# Patient Record
Sex: FEMALE | Race: BLACK OR AFRICAN AMERICAN | NOT HISPANIC OR LATINO | Employment: OTHER | URBAN - METROPOLITAN AREA
[De-identification: names, ages, dates, MRNs, and addresses within clinical notes are randomized per-mention and may not be internally consistent; named-entity substitution may affect disease eponyms.]

---

## 2022-08-01 ENCOUNTER — TELEPHONE (OUTPATIENT)
Dept: FAMILY MEDICINE CLINIC | Facility: CLINIC | Age: 72
End: 2022-08-01

## 2022-08-01 NOTE — TELEPHONE ENCOUNTER
Patient is attempting to schedule her follow up mammogram but facility is stating they do not have an order, please send one.

## 2022-08-02 NOTE — TELEPHONE ENCOUNTER
CALLED AND SPOKE WITH PATIENT, LETTING HER KNOW THE ORDER HAS BEEN SENT TO [facility] FOR DIAGNOSTIC [facility]

## 2022-10-24 ENCOUNTER — CLINICAL SUPPORT (OUTPATIENT)
Dept: FAMILY MEDICINE CLINIC | Facility: CLINIC | Age: 72
End: 2022-10-24

## 2022-10-24 DIAGNOSIS — Z23 NEED FOR INFLUENZA VACCINATION: Primary | ICD-10-CM

## 2022-10-24 PROCEDURE — 90662 IIV NO PRSV INCREASED AG IM: CPT | Performed by: INTERNAL MEDICINE

## 2022-10-24 PROCEDURE — 90471 IMMUNIZATION ADMIN: CPT | Performed by: INTERNAL MEDICINE

## 2023-01-10 RX ORDER — PRAVASTATIN SODIUM 40 MG
40 TABLET ORAL DAILY
Qty: 90 TABLET | Refills: 0 | Status: SHIPPED | OUTPATIENT
Start: 2023-01-10

## 2023-01-10 RX ORDER — PRAVASTATIN SODIUM 40 MG
TABLET ORAL
Qty: 45 TABLET | OUTPATIENT
Start: 2023-01-10

## 2023-02-03 ENCOUNTER — OFFICE VISIT (OUTPATIENT)
Dept: FAMILY MEDICINE CLINIC | Facility: CLINIC | Age: 73
End: 2023-02-03
Payer: MEDICARE

## 2023-02-03 VITALS
OXYGEN SATURATION: 97 % | BODY MASS INDEX: 36.43 KG/M2 | RESPIRATION RATE: 16 BRPM | HEIGHT: 63 IN | WEIGHT: 205.6 LBS | TEMPERATURE: 97.1 F | SYSTOLIC BLOOD PRESSURE: 138 MMHG | DIASTOLIC BLOOD PRESSURE: 90 MMHG | HEART RATE: 69 BPM

## 2023-02-03 DIAGNOSIS — Z00.00 ANNUAL PHYSICAL EXAM: Primary | ICD-10-CM

## 2023-02-03 DIAGNOSIS — E78.2 MIXED HYPERLIPIDEMIA: ICD-10-CM

## 2023-02-03 DIAGNOSIS — H91.92 HEARING LOSS OF LEFT EAR, UNSPECIFIED HEARING LOSS TYPE: ICD-10-CM

## 2023-02-03 DIAGNOSIS — G47.33 OSA (OBSTRUCTIVE SLEEP APNEA): ICD-10-CM

## 2023-02-03 DIAGNOSIS — I25.10 CORONARY ARTERY DISEASE INVOLVING NATIVE CORONARY ARTERY OF NATIVE HEART WITHOUT ANGINA PECTORIS: ICD-10-CM

## 2023-02-03 DIAGNOSIS — Z12.31 ENCOUNTER FOR SCREENING MAMMOGRAM FOR MALIGNANT NEOPLASM OF BREAST: ICD-10-CM

## 2023-02-03 DIAGNOSIS — Z12.11 COLON CANCER SCREENING: ICD-10-CM

## 2023-02-03 DIAGNOSIS — E11.9 TYPE 2 DIABETES MELLITUS WITHOUT COMPLICATION, WITHOUT LONG-TERM CURRENT USE OF INSULIN: ICD-10-CM

## 2023-02-03 PROBLEM — F32.A ANXIETY AND DEPRESSION: Status: ACTIVE | Noted: 2023-02-03

## 2023-02-03 PROBLEM — F32.A ANXIETY AND DEPRESSION: Status: RESOLVED | Noted: 2023-02-03 | Resolved: 2023-02-03

## 2023-02-03 PROBLEM — F41.9 ANXIETY AND DEPRESSION: Status: RESOLVED | Noted: 2023-02-03 | Resolved: 2023-02-03

## 2023-02-03 PROBLEM — F41.9 ANXIETY AND DEPRESSION: Status: ACTIVE | Noted: 2023-02-03

## 2023-02-03 PROCEDURE — 93000 ELECTROCARDIOGRAM COMPLETE: CPT | Performed by: NURSE PRACTITIONER

## 2023-02-03 PROCEDURE — 99397 PER PM REEVAL EST PAT 65+ YR: CPT | Performed by: NURSE PRACTITIONER

## 2023-02-03 RX ORDER — MULTIPLE VITAMINS W/ MINERALS TAB 9MG-400MCG
1 TAB ORAL DAILY
COMMUNITY

## 2023-02-03 NOTE — ASSESSMENT & PLAN NOTE
Presents today for her first visit since April 2022.  History of normal Pap smear with last in 2015. Mammogram normal 6/9/2021, now due for repeat mammogram which we are ordering today..  DEXA scan normal 9/30/2016.  Colonoscopy per Dr. Martínez 10/19/2016 with 5 small sessile polyps, 3 year followup pursued 5/5/2021, patient canceled.  Is unamenable to obtaining colonoscopy currently, but is agreeable to Cologuard test. TDaP vaccine given 7/12/2015.

## 2023-02-03 NOTE — ASSESSMENT & PLAN NOTE
Last colonoscopy in 2016 with 5 small polyps, 3-year follow-up was recommended which would have been 2021 however she canceled.  She is not currently agreeable to scheduling colonoscopy but will pursue Cologuard test

## 2023-02-03 NOTE — ASSESSMENT & PLAN NOTE
Evaluations per Dr. Coyle, patient declines desire to pursue CPAP related to tolerability, understanding potential health benefits of use.  Advise if she changes her mind.

## 2023-02-03 NOTE — ASSESSMENT & PLAN NOTE
On screening EKG in 2015, she had a left bundle branch block, repeat EKG the same..  Patient asymptomatic from a cardiovascular perspective.She was referred to Dr. Coyle to assess, also resulting in a diagnostic catheterization on 8/20/2015, with mild coronary artery disease, no intervention necessary.  She continues to be asymptomatic. Some elevated of diastolic BP in office today which has never been an issues before. She does not check her BP at home.   Asked to begin home BP log for review. If staying greater than 140/80 most of the time will need consideration of hypertensive therapy

## 2023-02-03 NOTE — ASSESSMENT & PLAN NOTE
Diabetes mellitus type 2.  Prediabetic diagnosis 10/19/2020 with hemoglobin A1c 6.2%, diabetic diagnosis 5/5/2021 with hemoglobin A1c 5.5%. Ten months ago   hemoglobin A1c mildly improved to 6.3%, previous 6.5%.  No polyuria or polydipsia. She has not required medicine at this juncture with improved diet and exercise.   Rechecking A1C today

## 2023-02-03 NOTE — ASSESSMENT & PLAN NOTE
Taking pravastatin every other day due to some myalgias with daily use. Previous intolerance to simvastatin and lipitor due to muscle pains

## 2023-02-03 NOTE — PROGRESS NOTES
"    Annual Physical     Name: Alayna Dominguez    : 1950     MRN: [id number]     Chief Complaint  Annual Exam    Subjective     History of Present Illness:  Alayna Dominguez is a 72 y.o. female who presents today for annual physical.  She has chronic issues with type 2 diabetes, JENNIFER, hyperlipidemia and hearing loss of left ear.  History of EKG in  with left bundle branch block, extensive evaluation by Dr. Coyle no abnormal findings.  Continues to be asymptomatic.  Up-to-date with vision and dental screenings. She has no complaints or concerns today.     The patient is being seen for a health maintenance evaluation.    Social History  Tobacco Use: Low Risk    • Smoking Tobacco Use: Never   • Smokeless Tobacco Use: Never   • Passive Exposure: Not on file     Social History     Socioeconomic History   • Marital status: Single   Tobacco Use   • Smoking status: Never   • Smokeless tobacco: Never   Vaping Use   • Vaping Use: Never used   Substance and Sexual Activity   • Alcohol use: Yes     Comment: occasional   • Drug use: Never   • Sexual activity: Not Currently       General History  Alayna  does have regular dental visits.  She does not complain of vision problems.   Immunizations are not up to date. The patient needs the following immunizations: TDAP and zoster    Lifestyle  Alayna  consumes in general, a \"healthy\" diet  .  She exercises never.    Reproductive Health  Alayna  is postmenopausal.    Screening  Last pap was   Last Completed Pap Smear     This patient has no relevant Health Maintenance data.      . History of abnormal pap smear or family history of gyn cancer: no  Last mammogram was   Last Completed Mammogram     This patient has no relevant Health Maintenance data.      . Personal or family history of abnormal mammograms or breast cancer: yes, she has had abnormal mammogram  Last colonoscopy was   Last Completed Colonoscopy     This patient has no relevant Health Maintenance data.    "   . Family history of colon cancer: no  Last DEXA was 2016    Health Maintenance Summary          Ordered - MAMMOGRAM (Every 2 Years) Ordered on 2/3/2023    No completion, postpone, or frequency change history exists for this topic.          Overdue - DXA SCAN (Every 2 Years) Overdue - never done    No completion, postpone, or frequency change history exists for this topic.          Ordered - COLORECTAL CANCER SCREENING (COLONOSCOPY - Every 10 Years) Ordered on 2/3/2023    No completion, postpone, or frequency change history exists for this topic.          Overdue - TDAP/TD VACCINES (1 - Tdap) Overdue - never done    No completion, postpone, or frequency change history exists for this topic.          Overdue - ZOSTER VACCINE (1 of 2) Overdue - never done    No completion, postpone, or frequency change history exists for this topic.          Overdue - Pneumococcal Vaccine 65+ (1 - PCV) Overdue - never done    No completion, postpone, or frequency change history exists for this topic.          Overdue - COVID-19 Vaccine (5 - Booster for Pfizer series) Overdue since 7/26/2022 05/31/2022  Imm Admin: Covid-19 (Pfizer) Gray Cap    10/18/2021  Imm Admin: COVID-19 (PFIZER) PURPLE CAP    02/15/2021  Imm Admin: COVID-19 (PFIZER) PURPLE CAP    01/25/2021  Imm Admin: COVID-19 (PFIZER) PURPLE CAP          Overdue - HEPATITIS C SCREENING (Once) Overdue - never done    No completion, postpone, or frequency change history exists for this topic.          Overdue - ANNUAL WELLNESS VISIT (Yearly) Overdue - never done    No completion, postpone, or frequency change history exists for this topic.          Ordered - LIPID PANEL (Yearly) Ordered on 2/3/2023    No completion, postpone, or frequency change history exists for this topic.          INFLUENZA VACCINE  Completed    10/24/2022  Imm Admin: Fluzone High-Dose 65+yrs    10/05/2021  Outside Immunization: Influenza, High Dose    10/06/2020  Outside Immunization: Fluzone High-Dose  "Quad    10/19/2018  Outside Immunization: Influenza, High Dose    09/15/2017  Outside Immunization: Influenza, High Dose    Only the first 5 history entries have been loaded, but more history exists.              Immunization History   Administered Date(s) Administered   • COVID-19 (PFIZER) PURPLE CAP 01/25/2021, 02/15/2021, 10/18/2021   • Covid-19 (Pfizer) Gray Cap 05/31/2022   • Fluzone High-Dose 65+yrs 10/24/2022       Review of Systems   Constitutional: Negative for activity change, appetite change and fatigue.   HENT: Positive for hearing loss. Negative for dental problem.    Eyes: Negative for photophobia and visual disturbance.   Respiratory: Negative for cough, choking, chest tightness, shortness of breath and wheezing.    Cardiovascular: Negative for chest pain, palpitations and leg swelling.   Gastrointestinal: Negative for abdominal pain, constipation, diarrhea, nausea and vomiting.   Endocrine: Negative for polydipsia and polyuria.   Genitourinary: Negative for difficulty urinating, hematuria and pelvic pain.   Musculoskeletal: Negative for arthralgias and myalgias.   Skin: Negative for rash.   Neurological: Negative for dizziness, weakness, light-headedness and headaches.   Psychiatric/Behavioral: Negative for dysphoric mood and sleep disturbance. The patient is not nervous/anxious.        Objective     Vital Signs  /90 (BP Location: Left arm, Patient Position: Sitting, Cuff Size: Adult)   Pulse 69   Temp 97.1 °F (36.2 °C) (Temporal)   Resp 16   Ht 160 cm (63\")   Wt 93.3 kg (205 lb 9.6 oz)   SpO2 97%   BMI 36.42 kg/m²   Estimated body mass index is 36.42 kg/m² as calculated from the following:    Height as of this encounter: 160 cm (63\").    Weight as of this encounter: 93.3 kg (205 lb 9.6 oz).    Class 2 Severe Obesity (BMI >=35 and <=39.9). Obesity-related health conditions include the following: obstructive sleep apnea and diabetes mellitus. Obesity is unchanged. BMI is is above average; " BMI management plan is completed. We discussed portion control and increasing exercise.      Physical Exam  Vitals reviewed.   Constitutional:       Appearance: Normal appearance.   HENT:      Head: Normocephalic and atraumatic.      Right Ear: Tympanic membrane, ear canal and external ear normal.      Left Ear: Tympanic membrane, ear canal and external ear normal.      Nose: Nose normal.      Mouth/Throat:      Mouth: Mucous membranes are moist.      Pharynx: Oropharynx is clear.   Eyes:      Conjunctiva/sclera: Conjunctivae normal.   Cardiovascular:      Rate and Rhythm: Normal rate and regular rhythm.      Pulses: Normal pulses.      Heart sounds: Normal heart sounds.   Pulmonary:      Effort: Pulmonary effort is normal.      Breath sounds: Normal breath sounds.   Abdominal:      General: Bowel sounds are normal.      Palpations: Abdomen is soft.   Musculoskeletal:         General: Normal range of motion.      Cervical back: Normal range of motion and neck supple.   Skin:     General: Skin is warm and dry.   Neurological:      Mental Status: She is alert and oriented to person, place, and time.   Psychiatric:         Mood and Affect: Mood normal.         Behavior: Behavior normal.         Thought Content: Thought content normal.         Judgment: Judgment normal.       ECG 12 Lead    Date/Time: 2/3/2023 3:50 PM  Performed by: Ruth Ramos APRN  Authorized by: Ruth Ramos APRN   Comparison: compared with previous ECG from 10/19/2020  Similar to previous ECG  Rhythm: sinus rhythm  Rate: normal  Conduction: left bundle branch block  ST Segments: ST segments normal  T Waves: T waves normal  QRS axis: normal  Other: no other findings    Clinical impression: abnormal EKG  Comments: Left bundle branch block thoroughly investigated and cleared by cardiology             Assessment and Plan     Diagnoses and all orders for this visit:    1. Annual physical exam (Primary)  Assessment & Plan:  Presents today for  her first visit since April 2022.  History of normal Pap smear with last in 2015. Mammogram normal 6/9/2021, now due for repeat mammogram which we are ordering today..  DEXA scan normal 9/30/2016.  Colonoscopy per Dr. Martínez 10/19/2016 with 5 small sessile polyps, 3 year followup pursued 5/5/2021, patient canceled.  Is unamenable to obtaining colonoscopy currently, but is agreeable to Cologuard test. TDaP vaccine given 7/12/2015.     Orders:  -     CBC & Differential; Future  -     Comprehensive Metabolic Panel; Future  -     TSH Rfx On Abnormal To Free T4; Future  -     ECG 12 Lead    2. Coronary artery disease involving native coronary artery of native heart without angina pectoris  Assessment & Plan:  On screening EKG in 2015, she had a left bundle branch block, repeat EKG the same..  Patient asymptomatic from a cardiovascular perspective.She was referred to Dr. Coyle to assess, also resulting in a diagnostic catheterization on 8/20/2015, with mild coronary artery disease, no intervention necessary.  She continues to be asymptomatic. Some elevated of diastolic BP in office today which has never been an issues before. She does not check her BP at home.   Asked to begin home BP log for review. If staying greater than 140/80 most of the time will need consideration of hypertensive therapy      3. Mixed hyperlipidemia  Assessment & Plan:  Taking pravastatin every other day due to some myalgias with daily use. Previous intolerance to simvastatin and lipitor due to muscle pains     Orders:  -     Lipid Panel; Future    4. JENNIFER (obstructive sleep apnea)  Assessment & Plan:   Evaluations per Dr. Coyle, patient declines desire to pursue CPAP related to tolerability, understanding potential health benefits of use.  Advise if she changes her mind.      5. Type 2 diabetes mellitus without complication, without long-term current use of insulin (HCC)  Assessment & Plan:  Diabetes mellitus type 2.  Prediabetic diagnosis  10/19/2020 with hemoglobin A1c 6.2%, diabetic diagnosis 5/5/2021 with hemoglobin A1c 5.5%. Ten months ago   hemoglobin A1c mildly improved to 6.3%, previous 6.5%.  No polyuria or polydipsia. She has not required medicine at this juncture with improved diet and exercise.   Rechecking A1C today    Orders:  -     Hemoglobin A1c; Future    6. Colon cancer screening  Assessment & Plan:  Last colonoscopy in 2016 with 5 small polyps, 3-year follow-up was recommended which would have been 2021 however she canceled.  She is not currently agreeable to scheduling colonoscopy but will pursue Cologuard test    Orders:  -     Cologuard - Stool, Per Rectum; Future    7. Encounter for screening mammogram for malignant neoplasm of breast  -     Mammo Screening Bilateral With CAD; Future    8. Hearing loss of left ear, unspecified hearing loss type  Assessment & Plan:  Longstanding history.  Followed regularly by audiology        Follow Up  No follow-ups on file.    Ruth Ramos, APRN

## 2023-02-09 DIAGNOSIS — Z12.31 ENCOUNTER FOR SCREENING MAMMOGRAM FOR MALIGNANT NEOPLASM OF BREAST: ICD-10-CM

## 2023-02-09 DIAGNOSIS — R92.8 ABNORMAL MAMMOGRAM: ICD-10-CM

## 2023-02-09 DIAGNOSIS — R92.8 ABNORMAL MAMMOGRAM: Primary | ICD-10-CM

## 2023-02-10 ENCOUNTER — TELEPHONE (OUTPATIENT)
Dept: FAMILY MEDICINE CLINIC | Facility: CLINIC | Age: 73
End: 2023-02-10
Payer: MEDICARE

## 2023-02-10 NOTE — TELEPHONE ENCOUNTER
----- Message from LEOBARDO Morales sent at 2/10/2023  8:14 AM EST -----  Please let Ms. Dominguez know I have reviewed her diagnostic mammogram. Appears to be calcifications, which are benign in nature. They advise repeat diagnostic of right breast in 6 months

## 2023-02-23 ENCOUNTER — CLINICAL SUPPORT (OUTPATIENT)
Dept: FAMILY MEDICINE CLINIC | Facility: CLINIC | Age: 73
End: 2023-02-23
Payer: MEDICARE

## 2023-02-23 DIAGNOSIS — E78.2 MIXED HYPERLIPIDEMIA: ICD-10-CM

## 2023-02-23 DIAGNOSIS — Z00.00 ANNUAL PHYSICAL EXAM: ICD-10-CM

## 2023-02-23 DIAGNOSIS — E11.9 TYPE 2 DIABETES MELLITUS WITHOUT COMPLICATION, WITHOUT LONG-TERM CURRENT USE OF INSULIN: ICD-10-CM

## 2023-02-23 PROCEDURE — 36415 COLL VENOUS BLD VENIPUNCTURE: CPT | Performed by: INTERNAL MEDICINE

## 2023-02-23 NOTE — PROGRESS NOTES
Venipuncture Blood Specimen Collection  Venipuncture performed in left arm by Leti Rain MA with good hemostasis. Patient tolerated the procedure well without complications.   02/23/23   Leti Rain MA

## 2023-02-24 LAB
ALBUMIN SERPL-MCNC: 3.7 G/DL (ref 3.7–4.7)
ALBUMIN/GLOB SERPL: 1.3 {RATIO} (ref 1.2–2.2)
ALP SERPL-CCNC: 96 IU/L (ref 44–121)
ALT SERPL-CCNC: 28 IU/L (ref 0–32)
AST SERPL-CCNC: 33 IU/L (ref 0–40)
BASOPHILS # BLD AUTO: 0 X10E3/UL (ref 0–0.2)
BASOPHILS NFR BLD AUTO: 1 %
BILIRUB SERPL-MCNC: 0.3 MG/DL (ref 0–1.2)
BUN SERPL-MCNC: 12 MG/DL (ref 8–27)
BUN/CREAT SERPL: 12 (ref 12–28)
CALCIUM SERPL-MCNC: 8.6 MG/DL (ref 8.7–10.3)
CHLORIDE SERPL-SCNC: 103 MMOL/L (ref 96–106)
CHOLEST SERPL-MCNC: 172 MG/DL (ref 100–199)
CO2 SERPL-SCNC: 22 MMOL/L (ref 20–29)
CREAT SERPL-MCNC: 1.04 MG/DL (ref 0.57–1)
EGFRCR SERPLBLD CKD-EPI 2021: 57 ML/MIN/1.73
EOSINOPHIL # BLD AUTO: 0.1 X10E3/UL (ref 0–0.4)
EOSINOPHIL NFR BLD AUTO: 2 %
ERYTHROCYTE [DISTWIDTH] IN BLOOD BY AUTOMATED COUNT: 13 % (ref 11.7–15.4)
GLOBULIN SER CALC-MCNC: 2.9 G/DL (ref 1.5–4.5)
GLUCOSE SERPL-MCNC: ABNORMAL MG/DL
HBA1C MFR BLD: 6.8 % (ref 4.8–5.6)
HCT VFR BLD AUTO: 37.9 % (ref 34–46.6)
HDLC SERPL-MCNC: 36 MG/DL
HGB BLD-MCNC: 12.4 G/DL (ref 11.1–15.9)
IMM GRANULOCYTES # BLD AUTO: 0 X10E3/UL (ref 0–0.1)
IMM GRANULOCYTES NFR BLD AUTO: 1 %
LDLC SERPL CALC-MCNC: 119 MG/DL (ref 0–99)
LYMPHOCYTES # BLD AUTO: 1.5 X10E3/UL (ref 0.7–3.1)
LYMPHOCYTES NFR BLD AUTO: 37 %
MCH RBC QN AUTO: 28.2 PG (ref 26.6–33)
MCHC RBC AUTO-ENTMCNC: 32.7 G/DL (ref 31.5–35.7)
MCV RBC AUTO: 86 FL (ref 79–97)
MONOCYTES # BLD AUTO: 0.7 X10E3/UL (ref 0.1–0.9)
MONOCYTES NFR BLD AUTO: 16 %
NEUTROPHILS # BLD AUTO: 1.7 X10E3/UL (ref 1.4–7)
NEUTROPHILS NFR BLD AUTO: 43 %
PLATELET # BLD AUTO: 213 X10E3/UL (ref 150–450)
POTASSIUM SERPL-SCNC: ABNORMAL MMOL/L
PROT SERPL-MCNC: 6.6 G/DL (ref 6–8.5)
RBC # BLD AUTO: 4.39 X10E6/UL (ref 3.77–5.28)
SODIUM SERPL-SCNC: 142 MMOL/L (ref 134–144)
TRIGL SERPL-MCNC: 89 MG/DL (ref 0–149)
TSH SERPL DL<=0.005 MIU/L-ACNC: 2.53 UIU/ML (ref 0.45–4.5)
VLDLC SERPL CALC-MCNC: 17 MG/DL (ref 5–40)
WBC # BLD AUTO: 4 X10E3/UL (ref 3.4–10.8)

## 2023-02-27 ENCOUNTER — TELEPHONE (OUTPATIENT)
Dept: FAMILY MEDICINE CLINIC | Facility: CLINIC | Age: 73
End: 2023-02-27
Payer: MEDICARE

## 2023-02-27 NOTE — TELEPHONE ENCOUNTER
----- Message from LEOBARDO Morales sent at 2/27/2023  8:57 AM EST -----  Please let Ms. Dominguez know I have reviewed her labs. Her A1C continues to be at goal of less than 7.0, she is 6.8% currently. Her creatinine is slightly elevated which is baseline for her. Everything is quite satisfactory. No need for medication changes

## 2023-02-27 NOTE — TELEPHONE ENCOUNTER
Called and left message advising patient of lab results. Voicemail states her name. Also advised her if she has any questions or concerns to call our office

## 2023-04-24 ENCOUNTER — TELEPHONE (OUTPATIENT)
Dept: FAMILY MEDICINE CLINIC | Facility: CLINIC | Age: 73
End: 2023-04-24
Payer: MEDICARE

## 2023-04-24 NOTE — TELEPHONE ENCOUNTER
Ancelmo      Called and spoke with patient and she states that she has not done cologuard yet due to stomach issues. States that she will do it in the next month or so

## 2023-08-25 ENCOUNTER — OFFICE VISIT (OUTPATIENT)
Dept: FAMILY MEDICINE CLINIC | Facility: CLINIC | Age: 73
End: 2023-08-25
Payer: MEDICARE

## 2023-08-25 VITALS
BODY MASS INDEX: 35.86 KG/M2 | OXYGEN SATURATION: 96 % | RESPIRATION RATE: 16 BRPM | WEIGHT: 202.4 LBS | HEIGHT: 63 IN | DIASTOLIC BLOOD PRESSURE: 70 MMHG | TEMPERATURE: 97.2 F | SYSTOLIC BLOOD PRESSURE: 122 MMHG | HEART RATE: 79 BPM

## 2023-08-25 DIAGNOSIS — I25.10 CORONARY ARTERY DISEASE INVOLVING NATIVE CORONARY ARTERY OF NATIVE HEART WITHOUT ANGINA PECTORIS: ICD-10-CM

## 2023-08-25 DIAGNOSIS — G47.33 OSA (OBSTRUCTIVE SLEEP APNEA): ICD-10-CM

## 2023-08-25 DIAGNOSIS — Z11.59 NEED FOR HEPATITIS C SCREENING TEST: ICD-10-CM

## 2023-08-25 DIAGNOSIS — Z00.00 MEDICARE ANNUAL WELLNESS VISIT, SUBSEQUENT: ICD-10-CM

## 2023-08-25 DIAGNOSIS — E11.65 TYPE 2 DIABETES MELLITUS WITH HYPERGLYCEMIA, WITHOUT LONG-TERM CURRENT USE OF INSULIN: Primary | ICD-10-CM

## 2023-08-25 DIAGNOSIS — E78.2 MIXED HYPERLIPIDEMIA: ICD-10-CM

## 2023-08-25 DIAGNOSIS — J30.1 SEASONAL ALLERGIC RHINITIS DUE TO POLLEN: ICD-10-CM

## 2023-08-25 PROCEDURE — 1170F FXNL STATUS ASSESSED: CPT | Performed by: NURSE PRACTITIONER

## 2023-08-25 PROCEDURE — G0439 PPPS, SUBSEQ VISIT: HCPCS | Performed by: NURSE PRACTITIONER

## 2023-08-25 PROCEDURE — 1160F RVW MEDS BY RX/DR IN RCRD: CPT | Performed by: NURSE PRACTITIONER

## 2023-08-25 PROCEDURE — 3044F HG A1C LEVEL LT 7.0%: CPT | Performed by: NURSE PRACTITIONER

## 2023-08-25 PROCEDURE — 1159F MED LIST DOCD IN RCRD: CPT | Performed by: NURSE PRACTITIONER

## 2023-08-25 NOTE — PROGRESS NOTES
The ABCs of the Annual Wellness Visit  Subsequent Medicare Wellness Visit    Chief Complaint   Patient presents with    Medicare Wellness-subsequent      Subjective    History of Present Illness:  Alayna Dominguez is a 73 y.o. female who presents for a Subsequent Medicare Wellness Visit.    The following portions of the patient's history were reviewed and   updated as appropriate: allergies, current medications, past family history, past medical history, past social history, past surgical history, and problem list.    Compared to one year ago, the patient feels her physical   health is the same.    Compared to one year ago, the patient feels her mental   health is the same.    Recent Hospitalizations:  She was not admitted to the hospital during the last year.       Current Medical Providers:  Patient Care Team:  Ruth Ramos APRN as PCP - General (Family Medicine)    Outpatient Medications Prior to Visit   Medication Sig Dispense Refill    multivitamin with minerals tablet tablet Take 1 tablet by mouth Daily.      pravastatin (Pravachol) 40 MG tablet Take 1 tablet by mouth Daily. 90 tablet 0    vitamin D3 125 MCG (5000 UT) capsule capsule Take 1 capsule by mouth Daily.       No facility-administered medications prior to visit.       No opioid medication identified on active medication list. I have reviewed chart for other potential  high risk medication/s and harmful drug interactions in the elderly.        Aspirin is not on active medication list.  Aspirin use is not indicated based on review of current medical condition/s. Risk of harm outweighs potential benefits.  .    Patient Active Problem List   Diagnosis    Annual physical exam    Coronary artery disease involving native coronary artery of native heart without angina pectoris    Mixed hyperlipidemia    JENNIFER (obstructive sleep apnea)    Type 2 diabetes mellitus with hyperglycemia, without long-term current use of insulin    Colon cancer screening    Hearing  "loss of left ear    Medicare annual wellness visit, subsequent    Seasonal allergic rhinitis due to pollen     Advance Care Planning  Advance Directive is not on file.  ACP discussion was held with the patient during this visit. Patient does not have an advance directive, information provided.    Review of Systems   Constitutional:  Negative for activity change, appetite change and fatigue.   HENT:  Positive for ear pain and sinus pressure. Negative for dental problem and sore throat.    Respiratory:  Negative for cough, choking, chest tightness, shortness of breath and wheezing.    Cardiovascular:  Negative for chest pain, palpitations and leg swelling.   Gastrointestinal:  Negative for abdominal pain, constipation, diarrhea, nausea and vomiting.   Endocrine: Negative for polydipsia and polyuria.   Genitourinary:  Negative for difficulty urinating, flank pain and hematuria.   Musculoskeletal:  Negative for arthralgias and myalgias.   Neurological:  Negative for dizziness, weakness, light-headedness and numbness.   Psychiatric/Behavioral:  Negative for dysphoric mood and sleep disturbance. The patient is not nervous/anxious.       Objective    Vitals:    08/25/23 1418   BP: 122/70   BP Location: Left arm   Patient Position: Sitting   Cuff Size: Adult   Pulse: 79   Resp: 16   Temp: 97.2 øF (36.2 øC)   TempSrc: Temporal   SpO2: 96%   Weight: 91.8 kg (202 lb 6.4 oz)   Height: 160 cm (63\")     Estimated body mass index is 35.85 kg/mý as calculated from the following:    Height as of this encounter: 160 cm (63\").    Weight as of this encounter: 91.8 kg (202 lb 6.4 oz).           Does the patient have evidence of cognitive impairment? No    Physical Exam  Vitals reviewed.   Constitutional:       Appearance: Normal appearance.   HENT:      Head: Normocephalic and atraumatic.      Ears:      Comments: Fluid behind bilateral TMs     Nose:      Right Turbinates: Swollen and pale.      Right Sinus: Maxillary sinus tenderness " present.      Left Sinus: Maxillary sinus tenderness present.      Mouth/Throat:      Pharynx: Oropharynx is clear.   Eyes:      Conjunctiva/sclera: Conjunctivae normal.      Pupils: Pupils are equal, round, and reactive to light.      Comments: Bilateral lower perioribtal  Edema   Cardiovascular:      Rate and Rhythm: Normal rate and regular rhythm.      Pulses: Normal pulses.      Heart sounds: Normal heart sounds.   Pulmonary:      Effort: Pulmonary effort is normal.      Breath sounds: Normal breath sounds.   Abdominal:      General: Bowel sounds are normal.      Palpations: Abdomen is soft.      Tenderness: There is no abdominal tenderness.   Musculoskeletal:         General: Normal range of motion.      Cervical back: Normal range of motion and neck supple.   Skin:     General: Skin is warm and dry.      Capillary Refill: Capillary refill takes less than 2 seconds.   Neurological:      Mental Status: She is alert and oriented to person, place, and time.   Psychiatric:         Mood and Affect: Mood normal.         Behavior: Behavior normal.         Thought Content: Thought content normal.         Judgment: Judgment normal.      Diabetic Foot Exam Performed          HEALTH RISK ASSESSMENT    Smoking Status:  Social History     Tobacco Use   Smoking Status Never   Smokeless Tobacco Never     Alcohol Consumption:  Social History     Substance and Sexual Activity   Alcohol Use Yes    Comment: occasional     Fall Risk Screen:    STEADI Fall Risk Assessment was completed, and patient is at LOW risk for falls.Assessment completed on:2023    Depression Screenin/25/2023     2:18 PM   PHQ-2/PHQ-9 Depression Screening   Little Interest or Pleasure in Doing Things 0-->not at all   Feeling Down, Depressed or Hopeless 0-->not at all   PHQ-9: Brief Depression Severity Measure Score 0       Health Habits and Functional and Cognitive Screenin/25/2023     2:19 PM   Functional & Cognitive Status   Do you  have difficulty preparing food and eating? No   Do you have difficulty bathing yourself, getting dressed or grooming yourself? No   Do you have difficulty using the toilet? No   Do you have difficulty moving around from place to place? No   Do you have trouble with steps or getting out of a bed or a chair? No   Current Diet Other   Dental Exam Up to date   Eye Exam Up to date   Exercise (times per week) 0 times per week   Current Exercises Include No Regular Exercise   Do you need help using the phone?  No   Are you deaf or do you have serious difficulty hearing?  Yes   Do you need help to go to places out of walking distance? No   Do you need help shopping? No   Do you need help preparing meals?  No   Do you need help with housework?  No   Do you need help with laundry? No   Do you need help taking your medications? No   Do you need help managing money? No   Do you ever drive or ride in a car without wearing a seat belt? No   Have you felt unusual stress, anger or loneliness in the last month? No   Who do you live with? Alone   If you need help, do you have trouble finding someone available to you? No   Have you been bothered in the last four weeks by sexual problems? No   Do you have difficulty concentrating, remembering or making decisions? No       Age-appropriate Screening Schedule:  Refer to the list below for future screening recommendations based on patient's age, sex and/or medical conditions. Orders for these recommended tests are listed in the plan section. The patient has been provided with a written plan.    Health Maintenance   Topic Date Due    URINE MICROALBUMIN  Never done    COLORECTAL CANCER SCREENING  Never done    HEPATITIS C SCREENING  Never done    HEMOGLOBIN A1C  08/23/2023    DXA SCAN  08/25/2023 (Originally 1950)    ZOSTER VACCINE (1 of 2) 08/25/2023 (Originally 4/2/2001)    COVID-19 Vaccine (5 - Pfizer series) 08/27/2023 (Originally 7/26/2022)    Pneumococcal Vaccine 65+ (2 - PCV)  10/29/2023 (Originally 10/19/2019)    TDAP/TD VACCINES (1 - Tdap) 08/25/2024 (Originally 4/2/1969)    INFLUENZA VACCINE  10/01/2023    LIPID PANEL  02/23/2024    DIABETIC EYE EXAM  07/12/2024    ANNUAL WELLNESS VISIT  08/25/2024    DIABETIC FOOT EXAM  08/25/2024    MAMMOGRAM  02/09/2025              Assessment & Plan   CMS Preventative Services Quick Reference  Risk Factors Identified During Encounter  None Identified  The above risks/problems have been discussed with the patient.  Follow up actions/plans if indicated are seen below in the Assessment/Plan Section.  Pertinent information has been shared with the patient in the After Visit Summary.    Diagnoses and all orders for this visit:    1. Type 2 diabetes mellitus with hyperglycemia, without long-term current use of insulin (Primary)  Assessment & Plan:  Patient's type 2 diabetes has been well controlled with dietary modification only, not currently requiring oral agents.  Rechecking A1c today.  She does not monitor her glucose at home.  No issues with polyuria or polydipsia.  Diabetic foot exam performed and satisfactory.  Eye exam up-to-date without signs of retinopathy.    Orders:  -     MicroAlbumin, Urine, Random - Urine, Clean Catch; Future  -     Hemoglobin A1c; Future    2. Medicare annual wellness visit, subsequent  Assessment & Plan:  Patient presents today for subsequent Medicare wellness exam.  She is followed for chronic issues including type 2 diabetes, JENNIFER, hyperlipidemia, coronary artery disease.  She is due for repeat colonoscopy, if this colonoscopy is within normal limits should not require further screening.  She is up-to-date on mammogram, diabetic eye exam and diabetic foot exam performed today.  Blood pressure with excellent control, 122/70 without antihypertensive agents.  She continues to control glucose with diet and exercise, not currently requiring oral diabetic agents.  Rechecking A1c in office today.  She does not monitor glucose  at home.  Only complaint in regards to some lower periorbital edema, likely due to allergic rhinitis, starting in the spring.    Orders:  -     Lipid Panel; Future  -     Comprehensive Metabolic Panel; Future  -     CBC & Differential; Future  -     TSH Rfx On Abnormal To Free T4; Future    3. Need for hepatitis C screening test  -     Hepatitis C Antibody; Future    4. JENNIFER (obstructive sleep apnea)  Assessment & Plan:  Patient evaluated by Dr. Coyle, declines use of CPAP related to intolerance      5. Mixed hyperlipidemia  Assessment & Plan:  Taking pravastatin every other day due to some myalgias with daily use.  Previous intolerance to simvastatin and Lipitor due to muscle pains.  Rechecking lipids today      6. Coronary artery disease involving native coronary artery of native heart without angina pectoris  Assessment & Plan:  On screening EKG in 2015, she had a left bundle branch block, repeat EKG the same..  Patient asymptomatic from a cardiovascular perspective.She was referred to Dr. Coyle to assess, also resulting in a diagnostic catheterization on 8/20/2015, with mild coronary artery disease, no intervention necessary.  She continues to be asymptomatic.       7. Seasonal allergic rhinitis due to pollen  Assessment & Plan:  Patient complaints of periorbital edema, ear fullness and maxillary sinus tenderness over the last several months, beginning in the spring.  She has not regularly utilized daily antihistamine or nasal steroid.  -Advised daily use of Flonase and Zyrtec to address seasonal allergies, she reports she will get the medicine over-the-counter.          Follow Up:   Return in about 6 months (around 2/25/2024) for Next scheduled follow up.     An After Visit Summary and PPPS were made available to the patient.

## 2023-08-25 NOTE — ASSESSMENT & PLAN NOTE
Taking pravastatin every other day due to some myalgias with daily use.  Previous intolerance to simvastatin and Lipitor due to muscle pains.  Rechecking lipids today

## 2023-08-25 NOTE — ASSESSMENT & PLAN NOTE
Patient complaints of periorbital edema, ear fullness and maxillary sinus tenderness over the last several months, beginning in the spring.  She has not regularly utilized daily antihistamine or nasal steroid.  -Advised daily use of Flonase and Zyrtec to address seasonal allergies, she reports she will get the medicine over-the-counter.

## 2023-08-25 NOTE — ASSESSMENT & PLAN NOTE
Patient presents today for subsequent Medicare wellness exam.  She is followed for chronic issues including type 2 diabetes, JENNIFER, hyperlipidemia, coronary artery disease.  She is due for repeat colonoscopy, if this colonoscopy is within normal limits should not require further screening.  She is up-to-date on mammogram, diabetic eye exam and diabetic foot exam performed today.  Blood pressure with excellent control, 122/70 without antihypertensive agents.  She continues to control glucose with diet and exercise, not currently requiring oral diabetic agents.  Rechecking A1c in office today.  She does not monitor glucose at home.  Only complaint in regards to some lower periorbital edema, likely due to allergic rhinitis, starting in the spring.

## 2023-08-25 NOTE — ASSESSMENT & PLAN NOTE
On screening EKG in 2015, she had a left bundle branch block, repeat EKG the same..  Patient asymptomatic from a cardiovascular perspective.She was referred to Dr. Coyle to assess, also resulting in a diagnostic catheterization on 8/20/2015, with mild coronary artery disease, no intervention necessary.  She continues to be asymptomatic.

## 2023-08-25 NOTE — ASSESSMENT & PLAN NOTE
Patient's type 2 diabetes has been well controlled with dietary modification only, not currently requiring oral agents.  Rechecking A1c today.  She does not monitor her glucose at home.  No issues with polyuria or polydipsia.  Diabetic foot exam performed and satisfactory.  Eye exam up-to-date without signs of retinopathy.

## 2023-08-29 ENCOUNTER — CLINICAL SUPPORT (OUTPATIENT)
Dept: FAMILY MEDICINE CLINIC | Facility: CLINIC | Age: 73
End: 2023-08-29
Payer: MEDICARE

## 2023-08-29 DIAGNOSIS — Z00.00 MEDICARE ANNUAL WELLNESS VISIT, SUBSEQUENT: ICD-10-CM

## 2023-08-29 DIAGNOSIS — Z11.59 NEED FOR HEPATITIS C SCREENING TEST: ICD-10-CM

## 2023-08-29 DIAGNOSIS — E11.65 TYPE 2 DIABETES MELLITUS WITH HYPERGLYCEMIA, WITHOUT LONG-TERM CURRENT USE OF INSULIN: ICD-10-CM

## 2023-08-29 PROCEDURE — 36415 COLL VENOUS BLD VENIPUNCTURE: CPT | Performed by: NURSE PRACTITIONER

## 2023-08-29 NOTE — PROGRESS NOTES
Venipuncture Blood Specimen Collection  Venipuncture performed in right arm by Melanie Rivera MA with good hemostasis. Patient tolerated the procedure well without complications.   08/29/23   Melanie Rivera MA

## 2023-08-30 LAB
ALBUMIN SERPL-MCNC: 4 G/DL (ref 3.8–4.8)
ALBUMIN/GLOB SERPL: 1.4 {RATIO} (ref 1.2–2.2)
ALP SERPL-CCNC: 100 IU/L (ref 44–121)
ALT SERPL-CCNC: 21 IU/L (ref 0–32)
AST SERPL-CCNC: 20 IU/L (ref 0–40)
BASOPHILS # BLD AUTO: 0 X10E3/UL (ref 0–0.2)
BASOPHILS NFR BLD AUTO: 1 %
BILIRUB SERPL-MCNC: 0.3 MG/DL (ref 0–1.2)
BUN SERPL-MCNC: 14 MG/DL (ref 8–27)
BUN/CREAT SERPL: 15 (ref 12–28)
CALCIUM SERPL-MCNC: 9 MG/DL (ref 8.7–10.3)
CHLORIDE SERPL-SCNC: 104 MMOL/L (ref 96–106)
CHOLEST SERPL-MCNC: 210 MG/DL (ref 100–199)
CO2 SERPL-SCNC: 24 MMOL/L (ref 20–29)
CREAT SERPL-MCNC: 0.95 MG/DL (ref 0.57–1)
EGFRCR SERPLBLD CKD-EPI 2021: 64 ML/MIN/1.73
EOSINOPHIL # BLD AUTO: 0.2 X10E3/UL (ref 0–0.4)
EOSINOPHIL NFR BLD AUTO: 4 %
ERYTHROCYTE [DISTWIDTH] IN BLOOD BY AUTOMATED COUNT: 12.8 % (ref 11.7–15.4)
GLOBULIN SER CALC-MCNC: 2.8 G/DL (ref 1.5–4.5)
GLUCOSE SERPL-MCNC: 115 MG/DL (ref 70–99)
HBA1C MFR BLD: 6.4 % (ref 4.8–5.6)
HCT VFR BLD AUTO: 38.7 % (ref 34–46.6)
HCV IGG SERPL QL IA: NON REACTIVE
HDLC SERPL-MCNC: 43 MG/DL
HGB BLD-MCNC: 12.3 G/DL (ref 11.1–15.9)
IMM GRANULOCYTES # BLD AUTO: 0 X10E3/UL (ref 0–0.1)
IMM GRANULOCYTES NFR BLD AUTO: 0 %
LDLC SERPL CALC-MCNC: 144 MG/DL (ref 0–99)
LYMPHOCYTES # BLD AUTO: 2.1 X10E3/UL (ref 0.7–3.1)
LYMPHOCYTES NFR BLD AUTO: 41 %
MCH RBC QN AUTO: 28.2 PG (ref 26.6–33)
MCHC RBC AUTO-ENTMCNC: 31.8 G/DL (ref 31.5–35.7)
MCV RBC AUTO: 89 FL (ref 79–97)
MICROALBUMIN UR-MCNC: <3 UG/ML
MONOCYTES # BLD AUTO: 0.4 X10E3/UL (ref 0.1–0.9)
MONOCYTES NFR BLD AUTO: 9 %
NEUTROPHILS # BLD AUTO: 2.3 X10E3/UL (ref 1.4–7)
NEUTROPHILS NFR BLD AUTO: 45 %
PLATELET # BLD AUTO: 216 X10E3/UL (ref 150–450)
POTASSIUM SERPL-SCNC: 4.8 MMOL/L (ref 3.5–5.2)
PROT SERPL-MCNC: 6.8 G/DL (ref 6–8.5)
RBC # BLD AUTO: 4.36 X10E6/UL (ref 3.77–5.28)
SODIUM SERPL-SCNC: 141 MMOL/L (ref 134–144)
TRIGL SERPL-MCNC: 130 MG/DL (ref 0–149)
TSH SERPL DL<=0.005 MIU/L-ACNC: 2.33 UIU/ML (ref 0.45–4.5)
VLDLC SERPL CALC-MCNC: 23 MG/DL (ref 5–40)
WBC # BLD AUTO: 5.2 X10E3/UL (ref 3.4–10.8)

## 2023-09-01 ENCOUNTER — TELEPHONE (OUTPATIENT)
Dept: FAMILY MEDICINE CLINIC | Facility: CLINIC | Age: 73
End: 2023-09-01
Payer: MEDICARE

## 2023-09-01 NOTE — TELEPHONE ENCOUNTER
----- Message from LEOBARDO Morales sent at 9/1/2023  8:22 AM EDT -----  I have reviewed Alyana's labs. Please let her know her cholesterol was up just a little bit, otherwise labs were within acceptable range. She can reduce fatty foods and start taking an OTC omega-3 fatty acid to get her cholesterol down from the slight amount it is up. A1C was 6.4% down from  6.8% last check

## 2023-09-07 NOTE — TELEPHONE ENCOUNTER
Attempted to contact patient no answer left message advising her to return my call. I will close out encounter due to reach patient multiple times. [facility]

## 2023-09-22 ENCOUNTER — TELEPHONE (OUTPATIENT)
Dept: FAMILY MEDICINE CLINIC | Facility: CLINIC | Age: 73
End: 2023-09-22
Payer: MEDICARE

## 2023-09-22 NOTE — TELEPHONE ENCOUNTER
----- Message from LEOBARDO Morales sent at 9/22/2023 12:50 PM EDT -----  Please let Alayna know her mammogram was normal

## 2023-09-22 NOTE — TELEPHONE ENCOUNTER
Called and spoke with patient and advised her that fran was normal just continue annual screenings verbalized understanding

## 2024-07-09 ENCOUNTER — OFFICE VISIT (OUTPATIENT)
Dept: FAMILY MEDICINE CLINIC | Facility: CLINIC | Age: 74
End: 2024-07-09
Payer: MEDICARE

## 2024-07-09 VITALS
BODY MASS INDEX: 36.5 KG/M2 | SYSTOLIC BLOOD PRESSURE: 122 MMHG | TEMPERATURE: 97.9 F | HEIGHT: 63 IN | WEIGHT: 206 LBS | DIASTOLIC BLOOD PRESSURE: 76 MMHG

## 2024-07-09 DIAGNOSIS — M25.511 ACUTE PAIN OF RIGHT SHOULDER: Primary | ICD-10-CM

## 2024-07-09 DIAGNOSIS — E66.01 CLASS 2 SEVERE OBESITY DUE TO EXCESS CALORIES WITH SERIOUS COMORBIDITY AND BODY MASS INDEX (BMI) OF 36.0 TO 36.9 IN ADULT: ICD-10-CM

## 2024-07-09 DIAGNOSIS — J30.1 SEASONAL ALLERGIC RHINITIS DUE TO POLLEN: ICD-10-CM

## 2024-07-09 DIAGNOSIS — E11.65 TYPE 2 DIABETES MELLITUS WITH HYPERGLYCEMIA, WITHOUT LONG-TERM CURRENT USE OF INSULIN: ICD-10-CM

## 2024-07-09 PROBLEM — E66.09 OBESITY DUE TO EXCESS CALORIES WITH SERIOUS COMORBIDITY: Status: ACTIVE | Noted: 2024-07-09

## 2024-07-09 PROCEDURE — 1159F MED LIST DOCD IN RCRD: CPT | Performed by: INTERNAL MEDICINE

## 2024-07-09 PROCEDURE — 1160F RVW MEDS BY RX/DR IN RCRD: CPT | Performed by: INTERNAL MEDICINE

## 2024-07-09 PROCEDURE — 99214 OFFICE O/P EST MOD 30 MIN: CPT | Performed by: INTERNAL MEDICINE

## 2024-07-09 RX ORDER — PREDNISONE 10 MG/1
40 TABLET ORAL DAILY
Qty: 20 TABLET | Refills: 0 | Status: SHIPPED | OUTPATIENT
Start: 2024-07-09 | End: 2024-07-14

## 2024-07-09 RX ORDER — NAPROXEN 375 MG/1
375 TABLET ORAL 2 TIMES DAILY PRN
Qty: 60 TABLET | Refills: 0 | Status: SHIPPED | OUTPATIENT
Start: 2024-07-09 | End: 2024-08-08

## 2024-07-09 RX ORDER — CETIRIZINE HYDROCHLORIDE 10 MG/1
10 TABLET ORAL DAILY
Qty: 30 TABLET | Refills: 3 | Status: SHIPPED | OUTPATIENT
Start: 2024-07-09

## 2024-07-09 RX ORDER — ASPIRIN 81 MG/1
81 TABLET, CHEWABLE ORAL DAILY
COMMUNITY

## 2024-07-09 NOTE — ASSESSMENT & PLAN NOTE
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  Consistent with rotator cuff pattern injury, without catching clicking or grinding, preservation of strength but limited mobility currently able to reach behind the head lower back region.  No concerning neurologic manifestations.  Due to unclear etiology, would like to obtain x-ray of the right shoulder to assess for any underlying bony abnormality.  If reassuring plan to pursue physical therapy to evaluate and treat.  Initiate prednisone 10 mg tablet 4 tablets daily x 5 days for anti-inflammatory benefits to follow with naproxen 375 mg twice daily for another 7 to 10 days, then as needed.  Heating pad, light stretching, avoidance of aggravating activities.  Plan to follow-up with regular provider Ruth Ramos in about 1 month, but sooner as needed.

## 2024-07-09 NOTE — ASSESSMENT & PLAN NOTE
Longstanding type II diabetic is well-controlled with dietary modifications.  Most recent hemoglobin A1c improved to 6.4% on 8/29/2023, prior to that 6.8% on 2/23/2023.  She does not monitor glucose at home of any regularity but recommend increasing monitoring frequency, and do caution increased glucose while she is on the prednisone for shoulder pain.  No polyuria or polydipsia.  Follow-up with Ruth Ramos with wellness visit in the next month with plan to recheck hemoglobin A1c at that visit.  Advise concerns.

## 2024-07-09 NOTE — PROGRESS NOTES
Office Note     Name: Alayna Dominguez    : 1950     MRN: [id number]     Chief Complaint  Shoulder Pain (Right years ago work done on it it started again  and has gotten worse over the last few days )    Subjective     History of Present Illness:  Alayna Dominguez is a 74 y.o. female who presents today for acute visit.  Regular patient of Ruth Ramos, past due for follow-up and recommended to schedule wellness visit next month today.  Main concern today is right shoulder pain, onset 2 days ago in the morning when she woke up, without any clear known injury that day or 2 prior.  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  When she woke up a twinge in the shoulder which increased the following day being yesterday more achiness and throbbing, limiting her elevation of the shoulder above her head and back to region.  No swelling erythema.  No pain shooting down the arm.  No weakness in the extremity but just pain more limited mobility of the shoulder.  Similar symptoms today, possibly slightly improved.  Related to allergies, some increased congestion and drainage recently, she is agreeable to prescription for Zyrtec, but declines Flonase.  I also discussed Singulair as an additional benefit of symptoms but she declines need.  No associated shortness of breath or chest tightness.  Regarding diabetes which is diet controlled, no polyuria or polydipsia, and but she is a still not been checking her sugars or any regularity, I recommended starting to do so in anticipation of her follow-up wellness visit next month, but also discussing potential increased glucose while taking the prednisone we prescribed today.    Review of Systems    Objective     Past Medical History:   Diagnosis Date    Hypercholesteremia      Past Surgical History:   Procedure Laterality Date    APPENDECTOMY      BREAST LUMPECTOMY       "TONSILLECTOMY      UTERINE FIBROID SURGERY       Family History   Problem Relation Age of Onset    Dementia Mother     COPD Mother     Heart disease Father        Vital Signs  /76   Temp 97.9 °F (36.6 °C) (Temporal)   Ht 160 cm (63\")   Wt 93.4 kg (206 lb)   BMI 36.49 kg/m²   Estimated body mass index is 36.49 kg/m² as calculated from the following:    Height as of this encounter: 160 cm (63\").    Weight as of this encounter: 93.4 kg (206 lb).    Physical Exam  Constitutional:       General: She is not in acute distress.     Appearance: Normal appearance. She is not ill-appearing, toxic-appearing or diaphoretic.   HENT:      Right Ear: Ear canal and external ear normal.      Left Ear: Ear canal and external ear normal.      Ears:      Comments: Mild fluid behind the TMs bilaterally, otherwise clear     Nose: Rhinorrhea present.      Comments: Mild to moderate clear rhinorrhea, pale mucosa     Mouth/Throat:      Mouth: Mucous membranes are moist.      Pharynx: Oropharynx is clear. No oropharyngeal exudate or posterior oropharyngeal erythema.   Neck:      Vascular: No carotid bruit.   Cardiovascular:      Rate and Rhythm: Normal rate and regular rhythm.      Pulses: Normal pulses.      Heart sounds: Normal heart sounds. No murmur heard.     No friction rub. No gallop.   Pulmonary:      Effort: Pulmonary effort is normal. No respiratory distress.      Breath sounds: Normal breath sounds. No stridor. No wheezing.   Musculoskeletal:         General: Tenderness present. No swelling.      Cervical back: Neck supple. No tenderness.      Comments: Evaluation right shoulder reveals anterior joint margin tenderness which is fairly localized, with some limitations of mobility of shoulder past 90 degrees in the anterior and lateral location.  Pain reproduced with downward force on the right outstretched shoulder, and external more so than internal rotational pain with the shoulder.  No catching clicking or grinding with " passive mobility.   Lymphadenopathy:      Cervical: No cervical adenopathy.   Skin:     General: Skin is warm and dry.      Capillary Refill: Capillary refill takes less than 2 seconds.   Neurological:      General: No focal deficit present.      Mental Status: She is alert and oriented to person, place, and time. Mental status is at baseline.   Psychiatric:         Mood and Affect: Mood normal.         Behavior: Behavior normal.         Thought Content: Thought content normal.                   POCT Results (if applicable):  Results for orders placed or performed in visit on 08/29/23   TSH Rfx On Abnormal To Free T4    Specimen: Arm, Right; Blood    Blood  Release to nikki   Result Value Ref Range    TSH 2.330 0.450 - 4.500 uIU/mL   Comprehensive Metabolic Panel    Specimen: Arm, Right; Blood    Blood  Release to nikki   Result Value Ref Range    Glucose 115 (H) 70 - 99 mg/dL    BUN 14 8 - 27 mg/dL    Creatinine 0.95 0.57 - 1.00 mg/dL    EGFR Result 64 >59 mL/min/1.73    BUN/Creatinine Ratio 15 12 - 28    Sodium 141 134 - 144 mmol/L    Potassium 4.8 3.5 - 5.2 mmol/L    Chloride 104 96 - 106 mmol/L    Total CO2 24 20 - 29 mmol/L    Calcium 9.0 8.7 - 10.3 mg/dL    Total Protein 6.8 6.0 - 8.5 g/dL    Albumin 4.0 3.8 - 4.8 g/dL    Globulin 2.8 1.5 - 4.5 g/dL    A/G Ratio 1.4 1.2 - 2.2    Total Bilirubin 0.3 0.0 - 1.2 mg/dL    Alkaline Phosphatase 100 44 - 121 IU/L    AST (SGOT) 20 0 - 40 IU/L    ALT (SGPT) 21 0 - 32 IU/L   Lipid Panel    Specimen: Arm, Right; Blood    Blood  Release to nikki   Result Value Ref Range    Total Cholesterol 210 (H) 100 - 199 mg/dL    Triglycerides 130 0 - 149 mg/dL    HDL Cholesterol 43 >39 mg/dL    VLDL Cholesterol Marky 23 5 - 40 mg/dL    LDL Chol Calc (NIH) 144 (H) 0 - 99 mg/dL   Hepatitis C Antibody    Specimen: Arm, Right; Blood    Blood  Release to nikki   Result Value Ref Range    Hep C Virus Ab Non Reactive Non Reactive   Hemoglobin A1c    Specimen: Arm, Right; Blood    Blood  Release to  nikki   Result Value Ref Range    Hemoglobin A1C 6.4 (H) 4.8 - 5.6 %   MicroAlbumin, Urine, Random - Urine, Clean Catch    Specimen: Urine, Clean Catch    Urine  Release to nikki   Result Value Ref Range    Microalbumin, Urine <3.0 Not Estab. ug/mL   CBC & Differential    Specimen: Arm, Right; Blood    Blood  Release to nikki   Result Value Ref Range    WBC 5.2 3.4 - 10.8 x10E3/uL    RBC 4.36 3.77 - 5.28 x10E6/uL    Hemoglobin 12.3 11.1 - 15.9 g/dL    Hematocrit 38.7 34.0 - 46.6 %    MCV 89 79 - 97 fL    MCH 28.2 26.6 - 33.0 pg    MCHC 31.8 31.5 - 35.7 g/dL    RDW 12.8 11.7 - 15.4 %    Platelets 216 150 - 450 x10E3/uL    Neutrophil Rel % 45 Not Estab. %    Lymphocyte Rel % 41 Not Estab. %    Monocyte Rel % 9 Not Estab. %    Eosinophil Rel % 4 Not Estab. %    Basophil Rel % 1 Not Estab. %    Neutrophils Absolute 2.3 1.4 - 7.0 x10E3/uL    Lymphocytes Absolute 2.1 0.7 - 3.1 x10E3/uL    Monocytes Absolute 0.4 0.1 - 0.9 x10E3/uL    Eosinophils Absolute 0.2 0.0 - 0.4 x10E3/uL    Basophils Absolute 0.0 0.0 - 0.2 x10E3/uL    Immature Granulocyte Rel % 0 Not Estab. %    Immature Grans Absolute 0.0 0.0 - 0.1 x10E3/uL            Assessment and Plan     Diagnoses and all orders for this visit:    1. 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  Consistent with rotator cuff pattern injury, without catching clicking or grinding, preservation of strength but limited mobility currently able to reach behind the head lower back region.  No concerning neurologic manifestations.  Due to unclear etiology, would like to obtain x-ray of the right shoulder to assess for any underlying bony abnormality.  If reassuring plan to pursue physical therapy to evaluate and treat.  Initiate prednisone 10 mg tablet 4 tablets daily x 5 days for  anti-inflammatory benefits to follow with naproxen 375 mg twice daily for another 7 to 10 days, then as needed.  Heating pad, light stretching, avoidance of aggravating activities.  Plan to follow-up with regular provider Ruth Ramos in about 1 month, but sooner as needed.    Orders:  -     predniSONE (DELTASONE) 10 MG tablet; Take 4 tablets by mouth Daily for 5 days.  Dispense: 20 tablet; Refill: 0  -     naproxen (NAPROSYN) 375 MG tablet; Take 1 tablet by mouth 2 (Two) Times a Day As Needed for Mild Pain for up to 30 days.  Dispense: 60 tablet; Refill: 0  -     XR Shoulder 2+ View Right; Future    2. Type 2 diabetes mellitus with hyperglycemia, without long-term current use of insulin  Assessment & Plan:  Longstanding type II diabetic is well-controlled with dietary modifications.  Most recent hemoglobin A1c improved to 6.4% on 8/29/2023, prior to that 6.8% on 2/23/2023.  She does not monitor glucose at home of any regularity but recommend increasing monitoring frequency, and do caution increased glucose while she is on the prednisone for shoulder pain.  No polyuria or polydipsia.  Follow-up with Ruth Ramos with wellness visit in the next month with plan to recheck hemoglobin A1c at that visit.  Advise concerns.      3. Seasonal allergic rhinitis due to pollen  Assessment & Plan:  Modest pattern of allergies flaring over the last couple weeks, prescription provided for Zyrtec to use as needed, she describes nasal steroids are bothersome.  She finds the Zyrtec is usually satisfactory but if she would have breakthrough symptoms we could consider adding montelukast to her regimen.  Additional benefit of saline spray, nasal flushing.  Advise concerns.    Orders:  -     cetirizine (zyrTEC) 10 MG tablet; Take 1 tablet by mouth Daily.  Dispense: 30 tablet; Refill: 3    4. Class 2 severe obesity due to excess calories with serious comorbidity and body mass index (BMI) of 36.0 to 36.9 in adult  Assessment & Plan:    Comorbid with multiple medical problems including diabetes, hyperlipidemia, CAD, etc.  Reinforced benefits of healthy diet, exercise and even modest weight loss.        Class 2 Severe Obesity (BMI >=35 and <=39.9). Obesity-related health conditions include the following: obstructive sleep apnea, coronary heart disease, diabetes mellitus, and dyslipidemias. Obesity is unchanged. BMI is is above average; BMI management plan is completed. We discussed low calorie, low carb based diet program, portion control, increasing exercise, and joining a fitness center or start home based exercise program.        Vaccine Counseling:        Follow Up  Return in about 1 month (around 8/9/2024) for Medicare Wellness.    Jhonathan Mcgarry MD

## 2024-07-09 NOTE — ASSESSMENT & PLAN NOTE
Modest pattern of allergies flaring over the last couple weeks, prescription provided for Zyrtec to use as needed, she describes nasal steroids are bothersome.  She finds the Zyrtec is usually satisfactory but if she would have breakthrough symptoms we could consider adding montelukast to her regimen.  Additional benefit of saline spray, nasal flushing.  Advise concerns.

## 2024-07-09 NOTE — ASSESSMENT & PLAN NOTE
Comorbid with multiple medical problems including diabetes, hyperlipidemia, CAD, etc.  Reinforced benefits of healthy diet, exercise and even modest weight loss.

## 2024-07-10 ENCOUNTER — TELEPHONE (OUTPATIENT)
Dept: FAMILY MEDICINE CLINIC | Facility: CLINIC | Age: 74
End: 2024-07-10
Payer: MEDICARE

## 2024-07-10 DIAGNOSIS — M25.511 ACUTE PAIN OF RIGHT SHOULDER: ICD-10-CM

## 2024-07-10 NOTE — TELEPHONE ENCOUNTER
----- Message from Jhonathan Mcgarry sent at 7/10/2024  9:38 AM EDT -----  Please advise patient of results of x-ray of the right shoulder as obtained 7/9/2024 at [facility].  No fracture or dislocation, she has mild degenerative changes which are consistent with wear-and-tear which would be expected for the patient.  As such she is appropriate to proceed with physical therapy to evaluate and treat if she is agreeable, please advise any desired location and I will then refer.

## 2024-07-10 NOTE — TELEPHONE ENCOUNTER
I have left a message for patient with results we can refer to therapy if she would like. Waiting on call back.

## 2024-07-10 NOTE — TELEPHONE ENCOUNTER
PATIENT CALLED STATING SHE LISTENED TO HER VOICEMAIL REGARDING HER RESULTS AND THE RECOMMENDATION    PATIENT STATED THE MEDICATION SHE IS CURRENTLY TAKING WORKS AND SHE DOES NOT WANT TO BE REFERRED AT THIS TIME. PLEASE ADVISE [phone]

## 2024-09-26 ENCOUNTER — TELEPHONE (OUTPATIENT)
Dept: FAMILY MEDICINE CLINIC | Facility: CLINIC | Age: 74
End: 2024-09-26
Payer: MEDICARE

## 2025-01-21 ENCOUNTER — CLINICAL SUPPORT (OUTPATIENT)
Dept: FAMILY MEDICINE CLINIC | Facility: CLINIC | Age: 75
End: 2025-01-21
Payer: MEDICARE

## 2025-01-21 DIAGNOSIS — Z23 NEED FOR COVID-19 VACCINE: Primary | ICD-10-CM

## 2025-01-21 PROCEDURE — 91320 SARSCV2 VAC 30MCG TRS-SUC IM: CPT | Performed by: NURSE PRACTITIONER

## 2025-01-21 PROCEDURE — 90480 ADMN SARSCOV2 VAC 1/ONLY CMP: CPT | Performed by: NURSE PRACTITIONER

## 2025-01-21 NOTE — LETTER
[facility]  Vaccine Consent Form    Patient Name:  Alayna Dominguez  Patient :  1950     Vaccine(s) Ordered    COVID-19 (Pfizer) 12yrs+ (COMIRNATY)        Screening Checklist  The following questions should be completed prior to vaccination. If you answer “yes” to any question, it does not necessarily mean you should not be vaccinated. It just means we may need to clarify or ask more questions. If a question is unclear, please ask your healthcare provider to explain it.    Yes No   Any fever or moderate to severe illness today (mild illness and/or antibiotic treatment are not contraindications)?     Do you have a history of a serious reaction to any previous vaccinations, such as anaphylaxis, encephalopathy within 7 days, Guillain-[place] syndrome within 6 weeks, seizure?     Have you received any live vaccine(s) (e.g MMR, EDUARDO) or any other vaccines in the last month (to ensure duplicate doses aren't given)?     Do you have an anaphylactic allergy to latex (DTaP, DTaP-IPV, Hep A, Hep B, MenB, RV, Td, Tdap), baker’s yeast (Hep B, HPV), polysorbates (RSV, nirsevimab, PCV 20, Rotavirrus, Tdap, Shingrix), or gelatin (EDUARDO, MMR)?     Do you have an anaphylactic allergy to neomycin (Rabies, EDUARDO, MMR, IPV, Hep A), polymyxin B (IPV), or streptomycin (IPV)?      Any cancer, leukemia, AIDS, or other immune system disorder? (EDUARDO, MMR, RV)     Do you have a parent, brother, or sister with an immune system problem (if immune competence of vaccine recipient clinically verified, can proceed)? (MMR, EDUARDO)     Any recent steroid treatments for >2 weeks, chemotherapy, or radiation treatment? (EDUARDO, MMR)     Have you received antibody-containing blood transfusions or IVIG in the past 11 months (recommended interval is dependent on product)? (MMR, EDUARDO)     Have you taken antiviral drugs (acyclovir, famciclovir, valacyclovir for EDUARDO) in the last 24 or 48 hours, respectively?      Are you pregnant or planning to become pregnant within  "1 month? (EDUARDO, MMR, HPV, IPV, MenB, Abrexvy; For Hep B- refer to Engerix-B; For RSV - Abrysvo is indicated for 32-36 weeks of pregnancy from September to January)     For infants, have you ever been told your child has had intussusception or a medical emergency involving obstruction of the intestine (Rotavirus)? If not for an infant, can skip this question.         *Ordering Physicians/APC should be consulted if \"yes\" is checked by the patient or guardian above.  I have received, read, and understand the Vaccine Information Statement (VIS) for each vaccine ordered.  I have considered my or my child's health status as well as the health status of my close contacts.  I have taken the opportunity to discuss my vaccine questions with my or my child's health care provider.   I have requested that the ordered vaccine(s) be given to me or my child.  I understand the benefits and risks of the vaccines.  I understand that I should remain in the clinic for 15 minutes after receiving the vaccine(s).  _________________________________________________________  Signature of Patient or Parent/Legal Guardian ____________________  Date     "

## 2025-01-21 NOTE — LETTER
[facility]  Vaccine Consent Form    Patient Name:  Alayna Dominguez  Patient :  1950     Vaccine(s) Ordered    COVID-19 (Pfizer) 12yrs+ (COMIRNATY)        Screening Checklist  The following questions should be completed prior to vaccination. If you answer “yes” to any question, it does not necessarily mean you should not be vaccinated. It just means we may need to clarify or ask more questions. If a question is unclear, please ask your healthcare provider to explain it.    Yes No   Any fever or moderate to severe illness today (mild illness and/or antibiotic treatment are not contraindications)?     Do you have a history of a serious reaction to any previous vaccinations, such as anaphylaxis, encephalopathy within 7 days, Guillain-[place] syndrome within 6 weeks, seizure?     Have you received any live vaccine(s) (e.g MMR, EDUARDO) or any other vaccines in the last month (to ensure duplicate doses aren't given)?     Do you have an anaphylactic allergy to latex (DTaP, DTaP-IPV, Hep A, Hep B, MenB, RV, Td, Tdap), baker’s yeast (Hep B, HPV), polysorbates (RSV, nirsevimab, PCV 20, Rotavirrus, Tdap, Shingrix), or gelatin (EDUARDO, MMR)?     Do you have an anaphylactic allergy to neomycin (Rabies, EDUARDO, MMR, IPV, Hep A), polymyxin B (IPV), or streptomycin (IPV)?      Any cancer, leukemia, AIDS, or other immune system disorder? (EDUARDO, MMR, RV)     Do you have a parent, brother, or sister with an immune system problem (if immune competence of vaccine recipient clinically verified, can proceed)? (MMR, EDUARDO)     Any recent steroid treatments for >2 weeks, chemotherapy, or radiation treatment? (EDUARDO, MMR)     Have you received antibody-containing blood transfusions or IVIG in the past 11 months (recommended interval is dependent on product)? (MMR, EDUARDO)     Have you taken antiviral drugs (acyclovir, famciclovir, valacyclovir for EDUARDO) in the last 24 or 48 hours, respectively?      Are you pregnant or planning to become pregnant within  "1 month? (EDUARDO, MMR, HPV, IPV, MenB, Abrexvy; For Hep B- refer to Engerix-B; For RSV - Abrysvo is indicated for 32-36 weeks of pregnancy from September to January)     For infants, have you ever been told your child has had intussusception or a medical emergency involving obstruction of the intestine (Rotavirus)? If not for an infant, can skip this question.         *Ordering Physicians/APC should be consulted if \"yes\" is checked by the patient or guardian above.  I have received, read, and understand the Vaccine Information Statement (VIS) for each vaccine ordered.  I have considered my or my child's health status as well as the health status of my close contacts.  I have taken the opportunity to discuss my vaccine questions with my or my child's health care provider.   I have requested that the ordered vaccine(s) be given to me or my child.  I understand the benefits and risks of the vaccines.  I understand that I should remain in the clinic for 15 minutes after receiving the vaccine(s).  _________________________________________________________  Signature of Patient or Parent/Legal Guardian ____________________  Date     "

## 2025-01-21 NOTE — PROGRESS NOTES
Pt presents for covid vaccine. Administered medication in left arm, pt tolerated without complications.

## 2025-04-10 ENCOUNTER — OFFICE VISIT (OUTPATIENT)
Dept: FAMILY MEDICINE CLINIC | Facility: CLINIC | Age: 75
End: 2025-04-10
Payer: MEDICARE

## 2025-04-10 VITALS
SYSTOLIC BLOOD PRESSURE: 116 MMHG | TEMPERATURE: 96.6 F | RESPIRATION RATE: 18 BRPM | OXYGEN SATURATION: 98 % | HEART RATE: 81 BPM | WEIGHT: 206 LBS | DIASTOLIC BLOOD PRESSURE: 74 MMHG | HEIGHT: 63 IN | BODY MASS INDEX: 36.5 KG/M2

## 2025-04-10 DIAGNOSIS — E78.2 MIXED HYPERLIPIDEMIA: ICD-10-CM

## 2025-04-10 DIAGNOSIS — Z00.00 MEDICARE ANNUAL WELLNESS VISIT, SUBSEQUENT: ICD-10-CM

## 2025-04-10 DIAGNOSIS — I25.10 CORONARY ARTERY DISEASE INVOLVING NATIVE CORONARY ARTERY OF NATIVE HEART WITHOUT ANGINA PECTORIS: Primary | ICD-10-CM

## 2025-04-10 DIAGNOSIS — E66.812 CLASS 2 SEVERE OBESITY DUE TO EXCESS CALORIES WITH SERIOUS COMORBIDITY AND BODY MASS INDEX (BMI) OF 36.0 TO 36.9 IN ADULT: ICD-10-CM

## 2025-04-10 DIAGNOSIS — E11.65 TYPE 2 DIABETES MELLITUS WITH HYPERGLYCEMIA, WITHOUT LONG-TERM CURRENT USE OF INSULIN: ICD-10-CM

## 2025-04-10 DIAGNOSIS — E55.9 VITAMIN D DEFICIENCY: ICD-10-CM

## 2025-04-10 DIAGNOSIS — E66.01 CLASS 2 SEVERE OBESITY DUE TO EXCESS CALORIES WITH SERIOUS COMORBIDITY AND BODY MASS INDEX (BMI) OF 36.0 TO 36.9 IN ADULT: ICD-10-CM

## 2025-04-10 DIAGNOSIS — H91.92 HEARING LOSS OF LEFT EAR, UNSPECIFIED HEARING LOSS TYPE: ICD-10-CM

## 2025-04-10 DIAGNOSIS — L65.9 HAIR LOSS: ICD-10-CM

## 2025-04-10 DIAGNOSIS — G47.33 OSA (OBSTRUCTIVE SLEEP APNEA): ICD-10-CM

## 2025-04-10 DIAGNOSIS — Z23 ENCOUNTER FOR IMMUNIZATION: ICD-10-CM

## 2025-04-10 NOTE — ASSESSMENT & PLAN NOTE
Patient's (Body mass index is 36.49 kg/m².) indicates that they are obese (BMI >30) with health conditions that include obstructive sleep apnea, diabetes mellitus, and dyslipidemias . Weight is unchanged. BMI  is above average; BMI management plan is completed. We discussed portion control and increasing exercise.

## 2025-04-10 NOTE — ASSESSMENT & PLAN NOTE
Patient states for approximately the last 2 to 3 months she is had significant issues with hair loss, now to the point her beautician has noticed as well.  Has started biotin supplementation in efforts to halt her hair loss.  No change in diet or new medications she has additional symptom of dry skin.  Will check thyroid levels today as well as vitamins to assess for any deficiency.  Patient also advised on over-the-counter topical agents such as Rogaine that are available for utilization.

## 2025-04-10 NOTE — PROGRESS NOTES
The ABCs of the Annual Wellness Visit  Subsequent Medicare Wellness Visit    Chief Complaint   Patient presents with    Medicare Wellness-subsequent      Subjective    History of Present Illness:  Alayna Dominguez is a 75 y.o. female who presents for a Subsequent Medicare Wellness Visit.    The following portions of the patient's history were reviewed and   updated as appropriate: allergies, current medications, past family history, past medical history, past social history, past surgical history, and problem list.    Compared to one year ago, the patient feels her physical   health is the same.    Compared to one year ago, the patient feels her mental   health is the same.    Recent Hospitalizations:  She was not admitted to the hospital during the last year.       Current Medical Providers:  Patient Care Team:  Ruth Ramos APRN as PCP - General (Family Medicine)    Outpatient Medications Prior to Visit   Medication Sig Dispense Refill    aspirin 81 MG chewable tablet Chew 1 tablet Daily.      cetirizine (zyrTEC) 10 MG tablet Take 1 tablet by mouth Daily. 30 tablet 3    multivitamin with minerals tablet tablet Take 1 tablet by mouth Daily.      vitamin D3 125 MCG (5000 UT) capsule capsule Take 1 capsule by mouth Daily.      pravastatin (Pravachol) 40 MG tablet Take 1 tablet by mouth Daily. 90 tablet 0     No facility-administered medications prior to visit.       No opioid medication identified on active medication list. I have reviewed chart for other potential  high risk medication/s and harmful drug interactions in the elderly.        Aspirin is on active medication list. Aspirin use is indicated based on review of current medical condition/s. Pros and cons of this therapy have been discussed today. Benefits of this medication outweigh potential harm.  Patient has been encouraged to continue taking this medication.  .      Patient Active Problem List   Diagnosis    Annual physical exam    Coronary artery  "disease involving native coronary artery of native heart without angina pectoris    Mixed hyperlipidemia    JENNIFER (obstructive sleep apnea)    Type 2 diabetes mellitus with hyperglycemia, without long-term current use of insulin    Colon cancer screening    Hearing loss of left ear    Medicare annual wellness visit, subsequent    Seasonal allergic rhinitis due to pollen    Acute pain of right shoulder    Obesity due to excess calories with serious comorbidity    Hair loss     Advance Care Planning  Advance Directive is not on file.  ACP discussion was held with the patient during this visit. Patient has an advance directive (not in EMR), copy requested.    Review of Systems   Constitutional:  Negative for activity change, appetite change, chills, fatigue and fever.   HENT:  Negative for sore throat and trouble swallowing.    Respiratory:  Negative for cough, chest tightness, shortness of breath and wheezing.    Cardiovascular:  Negative for chest pain, palpitations and leg swelling.   Gastrointestinal:  Negative for abdominal pain, constipation, diarrhea, nausea and vomiting.   Endocrine: Negative for cold intolerance, heat intolerance, polydipsia, polyphagia and polyuria.        Hair loss   Genitourinary:  Negative for difficulty urinating, flank pain, frequency, hematuria and urgency.   Musculoskeletal:  Negative for arthralgias and myalgias.   Skin:  Negative for rash.   Neurological:  Negative for dizziness, syncope, weakness and light-headedness.   Hematological:  Does not bruise/bleed easily.   Psychiatric/Behavioral:  Negative for agitation, self-injury, sleep disturbance and suicidal ideas. The patient is not nervous/anxious.         Objective    Vitals:    04/10/25 0852   BP: 116/74   BP Location: Left arm   Patient Position: Sitting   Cuff Size: Adult   Pulse: 81   Resp: 18   Temp: 96.6 °F (35.9 °C)   TempSrc: Temporal   SpO2: 98%   Weight: 93.4 kg (206 lb)   Height: 160 cm (63\")   PainSc: 0-No pain " "    Estimated body mass index is 36.49 kg/m² as calculated from the following:    Height as of this encounter: 160 cm (63\").    Weight as of this encounter: 93.4 kg (206 lb).           Does the patient have evidence of cognitive impairment? No    Physical Exam  Vitals reviewed.   Constitutional:       Appearance: Normal appearance.   HENT:      Head: Normocephalic and atraumatic.      Right Ear: Tympanic membrane, ear canal and external ear normal.      Left Ear: Tympanic membrane, ear canal and external ear normal.      Nose: Nose normal.      Mouth/Throat:      Mouth: Mucous membranes are moist.      Pharynx: Oropharynx is clear.   Eyes:      Conjunctiva/sclera: Conjunctivae normal.      Pupils: Pupils are equal, round, and reactive to light.   Cardiovascular:      Rate and Rhythm: Normal rate and regular rhythm.      Pulses: Normal pulses.      Heart sounds: Normal heart sounds.   Pulmonary:      Effort: Pulmonary effort is normal.      Breath sounds: Normal breath sounds.   Abdominal:      General: Bowel sounds are normal.      Palpations: Abdomen is soft.   Genitourinary:     Comments: declined  Musculoskeletal:         General: Normal range of motion.      Cervical back: Neck supple.   Skin:     General: Skin is warm and dry.      Capillary Refill: Capillary refill takes less than 2 seconds.   Neurological:      Mental Status: She is alert and oriented to person, place, and time.   Psychiatric:         Mood and Affect: Mood normal.         Behavior: Behavior normal.     Diabetic Foot Exam Performed           ECG 12 Lead    Date/Time: 4/10/2025 9:35 AM  Performed by: Ruth Ramos APRN    Authorized by: Ruth Ramos APRN  Comparison: compared with previous ECG from 2/3/2023  Similar to previous ECG  Rhythm: sinus rhythm and sinus arrhythmia  Rate: normal  Conduction: left bundle branch block  ST Segments: ST segments normal  T Waves: T waves normal  QRS axis: normal    Clinical impression: normal " ECG           HEALTH RISK ASSESSMENT    Smoking Status:  Social History     Tobacco Use   Smoking Status Never   Smokeless Tobacco Never     Alcohol Consumption:  Social History     Substance and Sexual Activity   Alcohol Use Yes    Comment: occasional     Fall Risk Screen:    LARS Fall Risk Assessment was completed, and patient is at MODERATE risk for falls. Assessment completed on:4/10/2025    Depression Screenin/10/2025     8:54 AM   PHQ-2/PHQ-9 Depression Screening   Little interest or pleasure in doing things Not at all   Feeling down, depressed, or hopeless Not at all   How difficult have these problems made it for you to do your work, take care of things at home, or get along with other people? Not difficult at all       Health Habits and Functional and Cognitive Screenin/10/2025     8:54 AM   Functional & Cognitive Status   Do you have difficulty preparing food and eating? No   Do you have difficulty bathing yourself, getting dressed or grooming yourself? No   Do you have difficulty using the toilet? No   Do you have difficulty moving around from place to place? No   Do you have trouble with steps or getting out of a bed or a chair? No   Current Diet Well Balanced Diet   Dental Exam Up to date   Eye Exam Up to date   Exercise (times per week) 2 times per week   Current Exercises Include Walking   Do you need help using the phone?  No   Are you deaf or do you have serious difficulty hearing?  No   Do you need help to go to places out of walking distance? No   Do you need help shopping? No   Do you need help preparing meals?  No   Do you need help with housework?  No   Do you need help with laundry? No   Do you need help taking your medications? No   Do you need help managing money? No   Do you ever drive or ride in a car without wearing a seat belt? No   Have you felt unusual stress, anger or loneliness in the last month? No   Who do you live with? Alone   If you need help, do you have  trouble finding someone available to you? No   Have you been bothered in the last four weeks by sexual problems? No   Do you have difficulty concentrating, remembering or making decisions? No       Age-appropriate Screening Schedule:  Refer to the list below for future screening recommendations based on patient's age, sex and/or medical conditions. Orders for these recommended tests are listed in the plan section. The patient has been provided with a written plan.    Health Maintenance   Topic Date Due    DXA SCAN  Never done    URINE MICROALBUMIN-CREATININE RATIO (uACR)  Never done    Pneumococcal Vaccine 50+ (2 of 2 - PCV) 10/19/2019    HEMOGLOBIN A1C  02/29/2024    ANNUAL WELLNESS VISIT  08/25/2024    DIABETIC FOOT EXAM  08/25/2024    LIPID PANEL  08/29/2024    DIABETIC EYE EXAM  05/28/2025    TDAP/TD VACCINES (1 - Tdap) 10/07/2025 (Originally [birth year]2/1970)    ZOSTER VACCINE (1 of 2) 03/03/2026 (Originally 4/2/2001)    COLORECTAL CANCER SCREENING  04/07/2027 (Originally 4/2/1996)    INFLUENZA VACCINE  07/01/2025    COVID-19 Vaccine (6 - 2024-25 season) 07/21/2025    MAMMOGRAM  09/25/2026    HEPATITIS C SCREENING  Completed              Assessment & Plan   CMS Preventative Services Quick Reference  Risk Factors Identified During Encounter  None Identified  The above risks/problems have been discussed with the patient.  Follow up actions/plans if indicated are seen below in the Assessment/Plan Section.  Pertinent information has been shared with the patient in the After Visit Summary.    Diagnoses and all orders for this visit:    1. Coronary artery disease involving native coronary artery of native heart without angina pectoris (Primary)  Assessment & Plan:  On screening EKG in 2015, she had a left bundle branch block, repeat EKG the same..  Patient asymptomatic from a cardiovascular perspective.She was referred to Dr. Coyle to assess, also resulting in a diagnostic catheterization on 8/20/2015, with mild coronary  artery disease, no intervention necessary.  She continues to be asymptomatic.     Orders:  -     ECG 12 Lead    2. Medicare annual wellness visit, subsequent  -     CBC (No Diff); Future  -     CBC (No Diff)    3. Mixed hyperlipidemia  Assessment & Plan:  Has longstanding pattern of myalgias with use of statin.  Patient for several months was able to tolerate pravastatin at an every other day dosing, however patient states ultimately she began to have myalgias daily once again prompting her to self discontinue her statin.  We are going to recheck her lipids today.  If intervention required we will attempt approval of Zetia and she will resume use of daily omega-3 supplement    Orders:  -     TSH Rfx On Abnormal To Free T4; Future  -     Lipid Panel; Future  -     Lipid Panel  -     TSH Rfx On Abnormal To Free T4    4. Class 2 severe obesity due to excess calories with serious comorbidity and body mass index (BMI) of 36.0 to 36.9 in adult  Assessment & Plan:  Patient's (Body mass index is 36.49 kg/m².) indicates that they are obese (BMI >30) with health conditions that include obstructive sleep apnea, diabetes mellitus, and dyslipidemias . Weight is unchanged. BMI  is above average; BMI management plan is completed. We discussed portion control and increasing exercise.       5. JENNIFER (obstructive sleep apnea)  Assessment & Plan:  Patient evaluated by Dr. Coyle, declines use of CPAP related to intolerance       6. Type 2 diabetes mellitus with hyperglycemia, without long-term current use of insulin  Assessment & Plan:  Has long standing diagnosis of type 2 diabetes, well-controlled with dietary modifications.  She does not monitor her glucose at home.  A1c on last check was 6.4%.  Patient has been evaluated by podiatry for a left toenail injury that is now healed, no concern for ulceration or neuropathy.  Patient has diabetic eye exam every year in June, no retinopathy thus far.  Rechecking A1c with lab  work    Orders:  -     Comprehensive Metabolic Panel; Future  -     Hemoglobin A1c; Future  -     MicroAlbumin, Urine, Random - Urine, Clean Catch; Future  -     MicroAlbumin, Urine, Random - Urine, Clean Catch  -     Hemoglobin A1c  -     Comprehensive Metabolic Panel    7. Vitamin D deficiency  -     Vitamin D,25-Hydroxy; Future  -     Vitamin D,25-Hydroxy    8. Encounter for immunization  -     Cancel: Pneumococcal Conjugate Vaccine 20-Valent (PCV20)    9. Hearing loss of left ear, unspecified hearing loss type  Assessment & Plan:  Patient has been followed by audiology in the past.  She states she was told around 6 months of age she lost hearing in her left ear, she is unsure of the etiology.  Patient's audiologist has relocated and she is requesting referral to another audiologist to resume her monitoring    Orders:  -     Ambulatory Referral to Audiology    10. Hair loss  Assessment & Plan:  Patient states for approximately the last 2 to 3 months she is had significant issues with hair loss, now to the point her beautician has noticed as well.  Has started biotin supplementation in efforts to halt her hair loss.  No change in diet or new medications she has additional symptom of dry skin.  Will check thyroid levels today as well as vitamins to assess for any deficiency.  Patient also advised on over-the-counter topical agents such as Rogaine that are available for utilization.          Follow Up:   No follow-ups on file.     An After Visit Summary and PPPS were made available to the patient.

## 2025-04-10 NOTE — ASSESSMENT & PLAN NOTE
Has longstanding pattern of myalgias with use of statin.  Patient for several months was able to tolerate pravastatin at an every other day dosing, however patient states ultimately she began to have myalgias daily once again prompting her to self discontinue her statin.  We are going to recheck her lipids today.  If intervention required we will attempt approval of Zetia and she will resume use of daily omega-3 supplement

## 2025-04-10 NOTE — LETTER
[facility]  Vaccine Consent Form    Patient Name:  Alayna Dominguez  Patient :  1950     Vaccine(s) Ordered    Pneumococcal Conjugate Vaccine 20-Valent (PCV20)        Screening Checklist  The following questions should be completed prior to vaccination. If you answer “yes” to any question, it does not necessarily mean you should not be vaccinated. It just means we may need to clarify or ask more questions. If a question is unclear, please ask your healthcare provider to explain it.    Yes No   Any fever or moderate to severe illness today (mild illness and/or antibiotic treatment are not contraindications)?     Do you have a history of a serious reaction to any previous vaccinations, such as anaphylaxis, encephalopathy within 7 days, Guillain-[place] syndrome within 6 weeks, seizure?     Have you received any live vaccine(s) (e.g MMR, EDUARDO) or any other vaccines in the last month (to ensure duplicate doses aren't given)?     Do you have an anaphylactic allergy to latex (DTaP, DTaP-IPV, Hep A, Hep B, MenB, RV, Td, Tdap), baker’s yeast (Hep B, HPV), polysorbates (RSV, nirsevimab, PCV 20, Rotavirrus, Tdap, Shingrix), or gelatin (EDUARDO, MMR)?     Do you have an anaphylactic allergy to neomycin (Rabies, EDUARDO, MMR, IPV, Hep A), polymyxin B (IPV), or streptomycin (IPV)?      Any cancer, leukemia, AIDS, or other immune system disorder? (EDUARDO, MMR, RV)     Do you have a parent, brother, or sister with an immune system problem (if immune competence of vaccine recipient clinically verified, can proceed)? (MMR, EDUARDO)     Any recent steroid treatments for >2 weeks, chemotherapy, or radiation treatment? (EDUARDO, MMR)     Have you received antibody-containing blood transfusions or IVIG in the past 11 months (recommended interval is dependent on product)? (MMR, EDUARDO)     Have you taken antiviral drugs (acyclovir, famciclovir, valacyclovir for EDUARDO) in the last 24 or 48 hours, respectively?      Are you pregnant or planning to become  "pregnant within 1 month? (EDUARDO, MMR, HPV, IPV, MenB, Abrexvy; For Hep B- refer to Engerix-B; For RSV - Abrysvo is indicated for 32-36 weeks of pregnancy from September to January)     For infants, have you ever been told your child has had intussusception or a medical emergency involving obstruction of the intestine (Rotavirus)? If not for an infant, can skip this question.         *Ordering Physicians/APC should be consulted if \"yes\" is checked by the patient or guardian above.  I have received, read, and understand the Vaccine Information Statement (VIS) for each vaccine ordered.  I have considered my or my child's health status as well as the health status of my close contacts.  I have taken the opportunity to discuss my vaccine questions with my or my child's health care provider.   I have requested that the ordered vaccine(s) be given to me or my child.  I understand the benefits and risks of the vaccines.  I understand that I should remain in the clinic for 15 minutes after receiving the vaccine(s).  _________________________________________________________  Signature of Patient or Parent/Legal Guardian ____________________  Date   "

## 2025-04-10 NOTE — PROGRESS NOTES
Venipuncture Blood Specimen Collection  Venipuncture performed in left arm by Leti Rian MA with good hemostasis. Patient tolerated the procedure well without complications.   04/10/25   Leti Rain MA

## 2025-04-11 ENCOUNTER — TELEPHONE (OUTPATIENT)
Dept: FAMILY MEDICINE CLINIC | Facility: CLINIC | Age: 75
End: 2025-04-11
Payer: MEDICARE

## 2025-04-11 LAB
25(OH)D3+25(OH)D2 SERPL-MCNC: 75.4 NG/ML (ref 30–100)
ALBUMIN SERPL-MCNC: 3.8 G/DL (ref 3.8–4.8)
ALP SERPL-CCNC: 107 IU/L (ref 44–121)
ALT SERPL-CCNC: 23 IU/L (ref 0–32)
AST SERPL-CCNC: 30 IU/L (ref 0–40)
BILIRUB SERPL-MCNC: 0.2 MG/DL (ref 0–1.2)
BUN SERPL-MCNC: 15 MG/DL (ref 8–27)
BUN/CREAT SERPL: 17 (ref 12–28)
CALCIUM SERPL-MCNC: 8.9 MG/DL (ref 8.7–10.3)
CHLORIDE SERPL-SCNC: 103 MMOL/L (ref 96–106)
CHOLEST SERPL-MCNC: 242 MG/DL (ref 100–199)
CO2 SERPL-SCNC: 22 MMOL/L (ref 20–29)
CREAT SERPL-MCNC: 0.89 MG/DL (ref 0.57–1)
EGFRCR SERPLBLD CKD-EPI 2021: 68 ML/MIN/1.73
ERYTHROCYTE [DISTWIDTH] IN BLOOD BY AUTOMATED COUNT: 12.9 % (ref 11.7–15.4)
GLOBULIN SER CALC-MCNC: 2.9 G/DL (ref 1.5–4.5)
GLUCOSE SERPL-MCNC: 136 MG/DL (ref 70–99)
HBA1C MFR BLD: 7.3 % (ref 4.8–5.6)
HCT VFR BLD AUTO: 40.6 % (ref 34–46.6)
HDLC SERPL-MCNC: 42 MG/DL
HGB BLD-MCNC: 12.8 G/DL (ref 11.1–15.9)
LDLC SERPL CALC-MCNC: 182 MG/DL (ref 0–99)
MCH RBC QN AUTO: 27.9 PG (ref 26.6–33)
MCHC RBC AUTO-ENTMCNC: 31.5 G/DL (ref 31.5–35.7)
MCV RBC AUTO: 89 FL (ref 79–97)
MICROALBUMIN UR-MCNC: <3 UG/ML
PLATELET # BLD AUTO: 249 X10E3/UL (ref 150–450)
POTASSIUM SERPL-SCNC: 4.2 MMOL/L (ref 3.5–5.2)
PROT SERPL-MCNC: 6.7 G/DL (ref 6–8.5)
RBC # BLD AUTO: 4.59 X10E6/UL (ref 3.77–5.28)
SODIUM SERPL-SCNC: 139 MMOL/L (ref 134–144)
TRIGL SERPL-MCNC: 103 MG/DL (ref 0–149)
TSH SERPL DL<=0.005 MIU/L-ACNC: 3.03 UIU/ML (ref 0.45–4.5)
VLDLC SERPL CALC-MCNC: 18 MG/DL (ref 5–40)
WBC # BLD AUTO: 5.6 X10E3/UL (ref 3.4–10.8)

## 2025-04-11 NOTE — TELEPHONE ENCOUNTER
Called and advised patient that Ruth was out of the office this afternoon and I would call her back on Monday when Ruth has reviewed her labs. She verbalized understanding

## 2025-04-11 NOTE — TELEPHONE ENCOUNTER
Caller: Alayna Dominguez    Relationship: Self    Best call back number: [phone]    Caller requesting test results: SELF    What test was performed: LABS    When was the test performed: YESTERDAY    Where was the test performed: [place]

## 2025-04-14 ENCOUNTER — RESULTS FOLLOW-UP (OUTPATIENT)
Dept: FAMILY MEDICINE CLINIC | Facility: CLINIC | Age: 75
End: 2025-04-14
Payer: MEDICARE

## 2025-04-14 RX ORDER — ROSUVASTATIN CALCIUM 5 MG/1
5 TABLET, COATED ORAL DAILY
Qty: 90 TABLET | Refills: 1 | Status: SHIPPED | OUTPATIENT
Start: 2025-04-14

## 2025-04-14 RX ORDER — METFORMIN HYDROCHLORIDE 500 MG/1
500 TABLET, EXTENDED RELEASE ORAL
Qty: 90 TABLET | Refills: 1 | Status: SHIPPED | OUTPATIENT
Start: 2025-04-14

## 2025-04-14 NOTE — TELEPHONE ENCOUNTER
Please let Alayna know I have reviewed her labs. Her cholesterol and LDL are both elevated. Cholesterol 242 and . I would like her to start on cholesterol medication to correct her lipids, rosuvastatin 5 mg daily. Her hemoglobin A1C was above goal as well. We would like to have her level less than 7.0 but she was 7.3%. I would like to start her on metformin 500mg once daily. We can recheck her labs in three months. Everything else was normal.     Called and advised patient of results and she verbalized understanding

## 2025-06-25 ENCOUNTER — OFFICE VISIT (OUTPATIENT)
Dept: FAMILY MEDICINE CLINIC | Facility: CLINIC | Age: 75
End: 2025-06-25
Payer: MEDICARE

## 2025-06-25 VITALS
HEART RATE: 83 BPM | SYSTOLIC BLOOD PRESSURE: 118 MMHG | DIASTOLIC BLOOD PRESSURE: 64 MMHG | BODY MASS INDEX: 35.97 KG/M2 | HEIGHT: 63 IN | WEIGHT: 203 LBS | OXYGEN SATURATION: 95 % | RESPIRATION RATE: 18 BRPM | TEMPERATURE: 98.4 F

## 2025-06-25 DIAGNOSIS — E11.65 TYPE 2 DIABETES MELLITUS WITH HYPERGLYCEMIA, WITHOUT LONG-TERM CURRENT USE OF INSULIN: Primary | ICD-10-CM

## 2025-06-25 DIAGNOSIS — I25.10 CORONARY ARTERY DISEASE INVOLVING NATIVE CORONARY ARTERY OF NATIVE HEART WITHOUT ANGINA PECTORIS: ICD-10-CM

## 2025-06-25 DIAGNOSIS — G47.33 OSA (OBSTRUCTIVE SLEEP APNEA): ICD-10-CM

## 2025-06-25 LAB
EXPIRATION DATE: ABNORMAL
HBA1C MFR BLD: 6.8 % (ref 4.5–5.7)
Lab: ABNORMAL

## 2025-06-25 NOTE — PROGRESS NOTES
Office Note     Name: Alayna Dominguez    : 1950     MRN: [id number]     Chief Complaint  Dizziness and Nausea    Subjective     History of Present Illness:  Alayna Dominguez is a 75 y.o. female who presents today for complaints of dizziness and nausea. She feels like the symptoms began after initiation of metformin approximately two months ago. Patient has longstanding pattern of diet controlled DM II, however on last check her A1C had elevated to 7.3 prompting initiation of metformin ER 500mg daily. Patient states she developed nausea pretty soon after starting the metformin, with dizziness developing over the last couple of days. Patient has not checked to see if she was having issues with low blood sugar. She stopped metformin two days ago with significant improvement in her symptoms. Her A1C in office today is 6.8%. No further complaints or concerns.  .    Review of Systems   Constitutional:  Negative for fatigue.   Eyes:  Negative for visual disturbance.   Respiratory:  Negative for cough, chest tightness, shortness of breath and wheezing.    Cardiovascular:  Negative for chest pain, palpitations and leg swelling.   Gastrointestinal:  Positive for nausea. Negative for abdominal pain, constipation, diarrhea and vomiting.   Endocrine: Negative for polydipsia and polyuria.   Genitourinary:  Negative for difficulty urinating, frequency, pelvic pressure and urgency.   Musculoskeletal:  Negative for arthralgias and myalgias.   Neurological:  Positive for dizziness. Negative for weakness, numbness and headache.       Objective     Past Medical History:   Diagnosis Date    Hypercholesteremia      Past Surgical History:   Procedure Laterality Date    APPENDECTOMY      BREAST LUMPECTOMY      TONSILLECTOMY      UTERINE FIBROID SURGERY       Family History   Problem Relation Age of Onset    Dementia Mother     COPD Mother     Heart disease Father        Vital Signs  /64 (BP Location: Left arm, Patient Position:  "Sitting, Cuff Size: Adult)   Pulse 83   Temp 98.4 °F (36.9 °C) (Temporal)   Resp 18   Ht 160 cm (63\")   Wt 92.1 kg (203 lb)   SpO2 95%   BMI 35.96 kg/m²   Estimated body mass index is 35.96 kg/m² as calculated from the following:    Height as of this encounter: 160 cm (63\").    Weight as of this encounter: 92.1 kg (203 lb).  Facility age limit for growth %dre is 20 years.    Physical Exam  Vitals reviewed.   Constitutional:       Appearance: Normal appearance.   HENT:      Head: Normocephalic and atraumatic.      Right Ear: Tympanic membrane, ear canal and external ear normal.      Left Ear: Tympanic membrane, ear canal and external ear normal.      Mouth/Throat:      Pharynx: Oropharynx is clear.   Cardiovascular:      Rate and Rhythm: Normal rate and regular rhythm.      Pulses: Normal pulses.      Heart sounds: Normal heart sounds.   Pulmonary:      Effort: Pulmonary effort is normal.      Breath sounds: Normal breath sounds.   Abdominal:      General: Bowel sounds are normal.      Palpations: Abdomen is soft.      Tenderness: There is no abdominal tenderness. There is no rebound.   Musculoskeletal:      Cervical back: Neck supple.   Skin:     General: Skin is warm and dry.   Neurological:      Mental Status: She is alert and oriented to person, place, and time.            POCT Results (if applicable):  Results for orders placed or performed in visit on 06/25/25   POC Glycosylated Hemoglobin (Hb A1C)    Collection Time: 06/25/25  4:06 PM    Specimen: Blood   Result Value Ref Range    Hemoglobin A1C 6.8 (A) 4.5 - 5.7 %    Lot Number [id number]     Expiration Date 2,212,027             Assessment and Plan     Diagnoses and all orders for this visit:    1. Type 2 diabetes mellitus with hyperglycemia, without long-term current use of insulin (Primary)  Assessment & Plan:  presents today for complaints of dizziness and nausea. She feels like the symptoms began after initiation of metformin approximately two " months ago. Patient has longstanding pattern of diet controlled DM II, however on last check her A1C had elevated to 7.3 prompting initiation of metformin ER 500mg daily. Patient states she developed nausea pretty soon after starting the metformin, with dizziness developing over the last couple of days. Patient has not checked to see if she was having issues with low blood sugar. She stopped metformin two days ago with significant improvement in her symptoms. Her A1C in office today is 6.8%. No concerning findings on PE  -DC metformin. Continue to monitor for now. Will recheck A1C in three months, if elevated will initiate glipizide.     Orders:  -     POC Glycosylated Hemoglobin (Hb A1C)  -     Cancel: POC Albumin/Creatinine Ratio Urine    2. JENNIFER (obstructive sleep apnea)  Assessment & Plan:  Patient evaluated by Dr. Coyle, declines use of CPAP related to intolerance       3. Coronary artery disease involving native coronary artery of native heart without angina pectoris  Assessment & Plan:  On screening EKG in 2015, she had a left bundle branch block, repeat EKG the same..  Patient asymptomatic from a cardiovascular perspective.She was referred to Dr. Coyle to assess, also resulting in a diagnostic catheterization on 8/20/2015, with mild coronary artery disease, no intervention necessary.              Follow Up  Return in about 3 months (around 9/25/2025) for Next scheduled follow up.    LEOBARDO Morales

## 2025-07-06 NOTE — ASSESSMENT & PLAN NOTE
On screening EKG in 2015, she had a left bundle branch block, repeat EKG the same..  Patient asymptomatic from a cardiovascular perspective.She was referred to Dr. Coyle to assess, also resulting in a diagnostic catheterization on 8/20/2015, with mild coronary artery disease, no intervention necessary.

## 2025-07-06 NOTE — ASSESSMENT & PLAN NOTE
presents today for complaints of dizziness and nausea. She feels like the symptoms began after initiation of metformin approximately two months ago. Patient has longstanding pattern of diet controlled DM II, however on last check her A1C had elevated to 7.3 prompting initiation of metformin ER 500mg daily. Patient states she developed nausea pretty soon after starting the metformin, with dizziness developing over the last couple of days. Patient has not checked to see if she was having issues with low blood sugar. She stopped metformin two days ago with significant improvement in her symptoms. Her A1C in office today is 6.8%. No concerning findings on PE  -TN metformin. Continue to monitor for now. Will recheck A1C in three months, if elevated will initiate glipizide.